# Patient Record
Sex: FEMALE | Race: WHITE | Employment: FULL TIME | URBAN - NONMETROPOLITAN AREA
[De-identification: names, ages, dates, MRNs, and addresses within clinical notes are randomized per-mention and may not be internally consistent; named-entity substitution may affect disease eponyms.]

---

## 2020-07-28 ENCOUNTER — OFFICE VISIT (OUTPATIENT)
Dept: ORTHOPEDIC SURGERY | Age: 50
End: 2020-07-28
Payer: COMMERCIAL

## 2020-07-28 VITALS — BODY MASS INDEX: 37.74 KG/M2 | HEIGHT: 63 IN | WEIGHT: 213 LBS

## 2020-07-28 PROBLEM — S83.241A ACUTE MEDIAL MENISCUS TEAR OF RIGHT KNEE: Status: ACTIVE | Noted: 2020-07-28

## 2020-07-28 PROBLEM — M17.11 PRIMARY OSTEOARTHRITIS OF RIGHT KNEE: Status: ACTIVE | Noted: 2020-07-28

## 2020-07-28 PROCEDURE — G8417 CALC BMI ABV UP PARAM F/U: HCPCS | Performed by: ORTHOPAEDIC SURGERY

## 2020-07-28 PROCEDURE — 99203 OFFICE O/P NEW LOW 30 MIN: CPT | Performed by: ORTHOPAEDIC SURGERY

## 2020-07-28 PROCEDURE — G8428 CUR MEDS NOT DOCUMENT: HCPCS | Performed by: ORTHOPAEDIC SURGERY

## 2020-07-28 NOTE — PROGRESS NOTES
I am evaluating this patient as a consult at the request of Nikkie Rosa DC      Chief Complaint:  New Patient (R KNEE PAIN )      History of Present Illness:  Ellen Smiley is a 53 y.o. female here regarding right knee injury. She has had discomfort on the medial and anterior aspect of the right knee since May, has sharp pain with twisting dull achy pain after standing for long periods of time. She was evaluated by Dr. Kristal Muniz who is treating her for low back pain, as the back pain resolved she began to notice increasing right knee pain  MRI was ordered and she is here today to discuss those results. Works at [employer], job is not physically demanding but repetitive. Currently has 6 out of 10 dull achy pain, not improved with exercise program by a chiropractor and anti-inflammatories. Pain Assessment:       Medical History:  No past medical history on file. No past surgical history on file.   Social History     Socioeconomic History    Marital status:      Spouse name: None    Number of children: None    Years of education: None    Highest education level: None   Occupational History    None   Social Needs    Financial resource strain: None    Food insecurity     Worry: None     Inability: None    Transportation needs     Medical: None     Non-medical: None   Tobacco Use    Smoking status: Never Smoker    Smokeless tobacco: Never Used   Substance and Sexual Activity    Alcohol use: None    Drug use: None    Sexual activity: None   Lifestyle    Physical activity     Days per week: None     Minutes per session: None    Stress: None   Relationships    Social connections     Talks on phone: None     Gets together: None     Attends Voodoo service: None     Active member of club or organization: None     Attends meetings of clubs or organizations: None     Relationship status: None    Intimate partner violence     Fear of current or ex partner: None     Emotionally abused: None Physically abused: None     Forced sexual activity: None   Other Topics Concern    None   Social History Narrative    None     Allergies not on file    Review of Systems:  Constitutional: negative  Respiratory: negative  Cardiovascular: negative  Musculoskeletal:negative except for New Patient (R KNEE PAIN )    Relevant review of systems reviewed and available in the patient's chart in media tab    Vital Signs:  Vitals:    07/28/20 1437   Weight: 213 lb (96.6 kg)   Height: 5' 3\" (1.6 m)         General Exam:   Constitutional: Patient is adequately groomed with no evidence of malnutrition  Mental Status: The patient is oriented to time, place and person. The patient's mood and affect are appropriate. Vascular: Examination reveals no swelling or calf tenderness. Peripheral pulses are palpable and 2+. right Knee Examination  Inspection:   No gross deformities noted. mild swelling noted. No erythema or ecchymosis. Skin is intact. Palpation: positive Tenderness to palpation along the medial joint line, negative Tenderness to palpation along lateral joint line, positive Tenderness to palpation along medial and lateral facets of undersurface of the patella    Range of Motion:  0-130    Strength:  Able to do SLR    Special Tests:  ACL, MCL, PCL, LCL are intact with stress exam.  There positive crepitus noted with range of motion under the patella. A positive grind test.  positive Lakesha's and Apley compression test.       Skin: There are no rashes, ulcerations or lesions. Gait: Normal gait, no assistive devices    Reflex: not tested    Additional Examinations:  Left Lower Extremity: Examination of the left lower extremity does not show any tenderness, deformity or injury. Range of motion is unremarkable. There is no gross instability. There are no rashes, ulcerations or lesions. Strength and tone are normal.      LUMBAR SPINE: The skin is warm and dry. There is no swelling, warmth, or erythema.  Range of motion is within normal limits. There is no paraspinal or spinous process tenderness. Ipsilateral and contralateral straight leg raising tests are negative. The distal neurovascular exam is grossly intact and symmetric. X-RAYS: 4 views weightbearing AP, lateral. PA and sunrise of the right knee were obtained and reviewed, they show no periosteal reaction, medullary lesions, or osteopenia. Mild arthritic changes in the patellofemoral compartment with marginal osteophytes, medial and lateral joint spaces are well maintained. No evidence of fracture or dislocation. MRI images of the right knee are personally reviewed and show:  Medial meniscus radial tear measuring 6 mm, trizonal in the posterior horn and root. Degenerative chondromalacia in the patellofemoral compartment    Assessment : Right knee medial meniscus tear, chondromalacia in the patellofemoral compartment    Impression:  Encounter Diagnoses   Name Primary?  Right knee pain, unspecified chronicity Yes    Acute medial meniscus tear of right knee, initial encounter     Primary osteoarthritis of right knee        Office Procedures:  Orders Placed This Encounter   Procedures    XR KNEE RIGHT (MIN 4 VIEWS)     Order Specific Question:   Reason for exam:     Answer:   RIGHT KNEE PAIN     No orders of the defined types were placed in this encounter. Treatment Plan: I had a lengthy discussion with the patient about the pathophysiology of degenerative medial meniscus tear and chondromalacia and their treatment options. The 1st option would be to pursue no further treatment and continue living with their knee pain and dysfunction. The 2nd treatment option would be to pursue conservative treatment, including physical therapy, injections and oral medications.   The 3rd option would be to pursue surgical intervention including right knee video arthroscopy partial medial meniscectomy and chondroplasty  Risks and benefits of each option were discussed in great detail with the patient, at this time the patient would like to pursue surgical intervention. I spent 25+ minutes face to face with the patient including 13+ minutes discussing and answering questions regarding the risks, benefits, and complications of right knee video arthroscopy partial medial meniscectomy and chondroplasty in detail. We talked about the risks of surgery which include but are not limited to infection, bleeding, nerve injury resulting in motor or sensory loss, DVT, RSD, persistent pain, loss of motion, functional instability, and need for further surgery. At no time were any guarantees implied or stated. The patient acknowledged these risks and electively reviewed and signed the consent form. Surgery will be scheduled for a mutually convenient time. Patient will obtain medical clearance from their PCP prior to surgery.        Derek Sanchez

## 2020-08-14 ENCOUNTER — TELEPHONE (OUTPATIENT)
Dept: ORTHOPEDIC SURGERY | Age: 50
End: 2020-08-14

## 2020-08-14 NOTE — TELEPHONE ENCOUNTER
FAXED COMPLETED FMLA FOR [employer] AND APS FOR PRINCIPAL INS TO [employer] @ [phone]

## 2020-08-17 ENCOUNTER — HOSPITAL ENCOUNTER (OUTPATIENT)
Age: 50
Discharge: HOME | End: 2020-08-17
Payer: COMMERCIAL

## 2020-08-17 ENCOUNTER — TELEPHONE (OUTPATIENT)
Dept: ORTHOPEDIC SURGERY | Age: 50
End: 2020-08-17

## 2020-08-17 PROCEDURE — U0003 INFECTIOUS AGENT DETECTION BY NUCLEIC ACID (DNA OR RNA); SEVERE ACUTE RESPIRATORY SYNDROME CORONAVIRUS 2 (SARS-COV-2) (CORONAVIRUS DISEASE [COVID-19]), AMPLIFIED PROBE TECHNIQUE, MAKING USE OF HIGH THROUGHPUT TECHNOLOGIES AS DESCRIBED BY CMS-2020-01-R: HCPCS

## 2020-08-17 RX ORDER — MULTIVIT-MIN/IRON/FOLIC ACID/K 18-600-40
CAPSULE ORAL
COMMUNITY

## 2020-08-17 SDOH — HEALTH STABILITY: MENTAL HEALTH: HOW OFTEN DO YOU HAVE A DRINK CONTAINING ALCOHOL?: NEVER

## 2020-08-19 ENCOUNTER — ANESTHESIA EVENT (OUTPATIENT)
Dept: OPERATING ROOM | Age: 50
End: 2020-08-19
Payer: COMMERCIAL

## 2020-08-19 LAB — SARS-COV-2, NAA: NOT DETECTED

## 2020-08-24 ENCOUNTER — ANESTHESIA (OUTPATIENT)
Dept: OPERATING ROOM | Age: 50
End: 2020-08-24
Payer: COMMERCIAL

## 2020-08-24 ENCOUNTER — HOSPITAL ENCOUNTER (OUTPATIENT)
Age: 50
Setting detail: OUTPATIENT SURGERY
Discharge: HOME | End: 2020-08-24
Attending: ORTHOPAEDIC SURGERY | Admitting: ORTHOPAEDIC SURGERY
Payer: COMMERCIAL

## 2020-08-24 VITALS
SYSTOLIC BLOOD PRESSURE: 117 MMHG | DIASTOLIC BLOOD PRESSURE: 68 MMHG | OXYGEN SATURATION: 99 % | TEMPERATURE: 98.6 F | RESPIRATION RATE: 19 BRPM

## 2020-08-24 VITALS
TEMPERATURE: 98 F | BODY MASS INDEX: 39.87 KG/M2 | OXYGEN SATURATION: 97 % | HEART RATE: 78 BPM | RESPIRATION RATE: 16 BRPM | DIASTOLIC BLOOD PRESSURE: 84 MMHG | HEIGHT: 63 IN | WEIGHT: 225 LBS | SYSTOLIC BLOOD PRESSURE: 124 MMHG

## 2020-08-24 LAB — PREGNANCY, URINE: NEGATIVE

## 2020-08-24 PROCEDURE — 3600000014 HC SURGERY LEVEL 4 ADDTL 15MIN: Performed by: ORTHOPAEDIC SURGERY

## 2020-08-24 PROCEDURE — 6360000002 HC RX W HCPCS: Performed by: ORTHOPAEDIC SURGERY

## 2020-08-24 PROCEDURE — 2580000003 HC RX 258: Performed by: ANESTHESIOLOGY

## 2020-08-24 PROCEDURE — 2500000003 HC RX 250 WO HCPCS: Performed by: NURSE ANESTHETIST, CERTIFIED REGISTERED

## 2020-08-24 PROCEDURE — 2720000010 HC SURG SUPPLY STERILE: Performed by: ORTHOPAEDIC SURGERY

## 2020-08-24 PROCEDURE — 6370000000 HC RX 637 (ALT 250 FOR IP): Performed by: ORTHOPAEDIC SURGERY

## 2020-08-24 PROCEDURE — 3600000004 HC SURGERY LEVEL 4 BASE: Performed by: ORTHOPAEDIC SURGERY

## 2020-08-24 PROCEDURE — 7100000011 HC PHASE II RECOVERY - ADDTL 15 MIN: Performed by: ORTHOPAEDIC SURGERY

## 2020-08-24 PROCEDURE — 6360000002 HC RX W HCPCS: Performed by: NURSE ANESTHETIST, CERTIFIED REGISTERED

## 2020-08-24 PROCEDURE — 3700000000 HC ANESTHESIA ATTENDED CARE: Performed by: ORTHOPAEDIC SURGERY

## 2020-08-24 PROCEDURE — 2500000003 HC RX 250 WO HCPCS: Performed by: ORTHOPAEDIC SURGERY

## 2020-08-24 PROCEDURE — 2709999900 HC NON-CHARGEABLE SUPPLY: Performed by: ORTHOPAEDIC SURGERY

## 2020-08-24 PROCEDURE — 84703 CHORIONIC GONADOTROPIN ASSAY: CPT

## 2020-08-24 PROCEDURE — 7100000001 HC PACU RECOVERY - ADDTL 15 MIN: Performed by: ORTHOPAEDIC SURGERY

## 2020-08-24 PROCEDURE — 3700000001 HC ADD 15 MINUTES (ANESTHESIA): Performed by: ORTHOPAEDIC SURGERY

## 2020-08-24 PROCEDURE — 7100000010 HC PHASE II RECOVERY - FIRST 15 MIN: Performed by: ORTHOPAEDIC SURGERY

## 2020-08-24 PROCEDURE — 7100000000 HC PACU RECOVERY - FIRST 15 MIN: Performed by: ORTHOPAEDIC SURGERY

## 2020-08-24 PROCEDURE — 6370000000 HC RX 637 (ALT 250 FOR IP)

## 2020-08-24 RX ORDER — LIDOCAINE HYDROCHLORIDE 20 MG/ML
INJECTION, SOLUTION EPIDURAL; INFILTRATION; INTRACAUDAL; PERINEURAL PRN
Status: DISCONTINUED | OUTPATIENT
Start: 2020-08-24 | End: 2020-08-24 | Stop reason: SDUPTHER

## 2020-08-24 RX ORDER — ONDANSETRON 2 MG/ML
4 INJECTION INTRAMUSCULAR; INTRAVENOUS EVERY 30 MIN PRN
Status: DISCONTINUED | OUTPATIENT
Start: 2020-08-24 | End: 2020-08-24 | Stop reason: HOSPADM

## 2020-08-24 RX ORDER — ACETAMINOPHEN 500 MG
1000 TABLET ORAL ONCE
Status: COMPLETED | OUTPATIENT
Start: 2020-08-24 | End: 2020-08-24

## 2020-08-24 RX ORDER — ONDANSETRON 4 MG/1
TABLET, ORALLY DISINTEGRATING ORAL
Status: COMPLETED
Start: 2020-08-24 | End: 2020-08-24

## 2020-08-24 RX ORDER — PROPOFOL 10 MG/ML
INJECTION, EMULSION INTRAVENOUS PRN
Status: DISCONTINUED | OUTPATIENT
Start: 2020-08-24 | End: 2020-08-24 | Stop reason: SDUPTHER

## 2020-08-24 RX ORDER — DIPHENHYDRAMINE HYDROCHLORIDE 50 MG/ML
6.25 INJECTION INTRAMUSCULAR; INTRAVENOUS
Status: DISCONTINUED | OUTPATIENT
Start: 2020-08-24 | End: 2020-08-24 | Stop reason: HOSPADM

## 2020-08-24 RX ORDER — ROCURONIUM BROMIDE 10 MG/ML
INJECTION, SOLUTION INTRAVENOUS PRN
Status: DISCONTINUED | OUTPATIENT
Start: 2020-08-24 | End: 2020-08-24 | Stop reason: SDUPTHER

## 2020-08-24 RX ORDER — BUPIVACAINE HYDROCHLORIDE 2.5 MG/ML
INJECTION, SOLUTION INFILTRATION; PERINEURAL PRN
Status: DISCONTINUED | OUTPATIENT
Start: 2020-08-24 | End: 2020-08-24 | Stop reason: ALTCHOICE

## 2020-08-24 RX ORDER — LIDOCAINE HYDROCHLORIDE 10 MG/ML
INJECTION, SOLUTION EPIDURAL; INFILTRATION; INTRACAUDAL; PERINEURAL
Status: COMPLETED
Start: 2020-08-24 | End: 2020-08-24

## 2020-08-24 RX ORDER — HYDROMORPHONE HCL 110MG/55ML
PATIENT CONTROLLED ANALGESIA SYRINGE INTRAVENOUS PRN
Status: DISCONTINUED | OUTPATIENT
Start: 2020-08-24 | End: 2020-08-24 | Stop reason: SDUPTHER

## 2020-08-24 RX ORDER — DEXAMETHASONE SODIUM PHOSPHATE 4 MG/ML
INJECTION, SOLUTION INTRA-ARTICULAR; INTRALESIONAL; INTRAMUSCULAR; INTRAVENOUS; SOFT TISSUE PRN
Status: DISCONTINUED | OUTPATIENT
Start: 2020-08-24 | End: 2020-08-24 | Stop reason: SDUPTHER

## 2020-08-24 RX ORDER — FENTANYL CITRATE 50 UG/ML
INJECTION, SOLUTION INTRAMUSCULAR; INTRAVENOUS PRN
Status: DISCONTINUED | OUTPATIENT
Start: 2020-08-24 | End: 2020-08-24 | Stop reason: SDUPTHER

## 2020-08-24 RX ORDER — OXYCODONE HYDROCHLORIDE AND ACETAMINOPHEN 5; 325 MG/1; MG/1
1 TABLET ORAL PRN
Status: DISCONTINUED | OUTPATIENT
Start: 2020-08-24 | End: 2020-08-24 | Stop reason: HOSPADM

## 2020-08-24 RX ORDER — SODIUM CHLORIDE 0.9 % (FLUSH) 0.9 %
10 SYRINGE (ML) INJECTION PRN
Status: DISCONTINUED | OUTPATIENT
Start: 2020-08-24 | End: 2020-08-24 | Stop reason: HOSPADM

## 2020-08-24 RX ORDER — ONDANSETRON 2 MG/ML
INJECTION INTRAMUSCULAR; INTRAVENOUS PRN
Status: DISCONTINUED | OUTPATIENT
Start: 2020-08-24 | End: 2020-08-24 | Stop reason: SDUPTHER

## 2020-08-24 RX ORDER — IBUPROFEN 600 MG/1
600 TABLET ORAL EVERY 8 HOURS PRN
Qty: 30 TABLET | Refills: 0 | Status: SHIPPED | OUTPATIENT
Start: 2020-08-24 | End: 2020-09-03

## 2020-08-24 RX ORDER — HYDRALAZINE HYDROCHLORIDE 20 MG/ML
5 INJECTION INTRAMUSCULAR; INTRAVENOUS EVERY 30 MIN PRN
Status: DISCONTINUED | OUTPATIENT
Start: 2020-08-24 | End: 2020-08-24 | Stop reason: HOSPADM

## 2020-08-24 RX ORDER — SODIUM CHLORIDE 0.9 % (FLUSH) 0.9 %
10 SYRINGE (ML) INJECTION EVERY 12 HOURS SCHEDULED
Status: DISCONTINUED | OUTPATIENT
Start: 2020-08-24 | End: 2020-08-24 | Stop reason: HOSPADM

## 2020-08-24 RX ORDER — HYDROCODONE BITARTRATE AND ACETAMINOPHEN 5; 325 MG/1; MG/1
1 TABLET ORAL EVERY 6 HOURS PRN
Qty: 28 TABLET | Refills: 0 | Status: SHIPPED | OUTPATIENT
Start: 2020-08-24 | End: 2020-08-31

## 2020-08-24 RX ORDER — OXYCODONE HYDROCHLORIDE AND ACETAMINOPHEN 5; 325 MG/1; MG/1
2 TABLET ORAL PRN
Status: DISCONTINUED | OUTPATIENT
Start: 2020-08-24 | End: 2020-08-24 | Stop reason: HOSPADM

## 2020-08-24 RX ORDER — LABETALOL HYDROCHLORIDE 5 MG/ML
5 INJECTION, SOLUTION INTRAVENOUS
Status: DISCONTINUED | OUTPATIENT
Start: 2020-08-24 | End: 2020-08-24 | Stop reason: HOSPADM

## 2020-08-24 RX ORDER — KETOROLAC TROMETHAMINE 30 MG/ML
INJECTION, SOLUTION INTRAMUSCULAR; INTRAVENOUS PRN
Status: DISCONTINUED | OUTPATIENT
Start: 2020-08-24 | End: 2020-08-24 | Stop reason: SDUPTHER

## 2020-08-24 RX ORDER — ONDANSETRON 4 MG/1
4 TABLET, ORALLY DISINTEGRATING ORAL ONCE
Status: DISCONTINUED | OUTPATIENT
Start: 2020-08-24 | End: 2020-08-24 | Stop reason: HOSPADM

## 2020-08-24 RX ORDER — MIDAZOLAM HYDROCHLORIDE 1 MG/ML
INJECTION INTRAMUSCULAR; INTRAVENOUS PRN
Status: DISCONTINUED | OUTPATIENT
Start: 2020-08-24 | End: 2020-08-24 | Stop reason: SDUPTHER

## 2020-08-24 RX ORDER — SODIUM CHLORIDE, SODIUM LACTATE, POTASSIUM CHLORIDE, CALCIUM CHLORIDE 600; 310; 30; 20 MG/100ML; MG/100ML; MG/100ML; MG/100ML
INJECTION, SOLUTION INTRAVENOUS CONTINUOUS
Status: DISCONTINUED | OUTPATIENT
Start: 2020-08-24 | End: 2020-08-24 | Stop reason: HOSPADM

## 2020-08-24 RX ADMIN — LIDOCAINE HYDROCHLORIDE 0.1 MG: 10 INJECTION, SOLUTION EPIDURAL; INFILTRATION; INTRACAUDAL; PERINEURAL at 06:37

## 2020-08-24 RX ADMIN — KETOROLAC TROMETHAMINE 30 MG: 30 INJECTION, SOLUTION INTRAMUSCULAR at 08:10

## 2020-08-24 RX ADMIN — Medication 2 G: at 07:22

## 2020-08-24 RX ADMIN — SODIUM CHLORIDE, POTASSIUM CHLORIDE, SODIUM LACTATE AND CALCIUM CHLORIDE: 600; 310; 30; 20 INJECTION, SOLUTION INTRAVENOUS at 07:58

## 2020-08-24 RX ADMIN — FENTANYL CITRATE 50 MCG: 50 INJECTION INTRAMUSCULAR; INTRAVENOUS at 07:26

## 2020-08-24 RX ADMIN — ONDANSETRON 4 MG: 2 INJECTION, SOLUTION INTRAMUSCULAR; INTRAVENOUS at 07:35

## 2020-08-24 RX ADMIN — DEXAMETHASONE SODIUM PHOSPHATE 10 MG: 4 INJECTION, SOLUTION INTRAMUSCULAR; INTRAVENOUS at 07:35

## 2020-08-24 RX ADMIN — ONDANSETRON 4 MG: 4 TABLET, ORALLY DISINTEGRATING ORAL at 09:08

## 2020-08-24 RX ADMIN — ACETAMINOPHEN 1000 MG: 500 TABLET, FILM COATED ORAL at 06:36

## 2020-08-24 RX ADMIN — FENTANYL CITRATE 100 MCG: 50 INJECTION INTRAMUSCULAR; INTRAVENOUS at 07:24

## 2020-08-24 RX ADMIN — PROPOFOL 200 MG: 10 INJECTION, EMULSION INTRAVENOUS at 07:26

## 2020-08-24 RX ADMIN — ROCURONIUM BROMIDE 15 MG: 10 INJECTION, SOLUTION INTRAVENOUS at 07:26

## 2020-08-24 RX ADMIN — MIDAZOLAM 2 MG: 1 INJECTION INTRAMUSCULAR; INTRAVENOUS at 07:22

## 2020-08-24 RX ADMIN — FENTANYL CITRATE 50 MCG: 50 INJECTION INTRAMUSCULAR; INTRAVENOUS at 07:33

## 2020-08-24 RX ADMIN — HYDROMORPHONE HYDROCHLORIDE 1 MG: 2 INJECTION, SOLUTION INTRAMUSCULAR; INTRAVENOUS; SUBCUTANEOUS at 08:04

## 2020-08-24 RX ADMIN — SODIUM CHLORIDE, POTASSIUM CHLORIDE, SODIUM LACTATE AND CALCIUM CHLORIDE: 600; 310; 30; 20 INJECTION, SOLUTION INTRAVENOUS at 06:36

## 2020-08-24 RX ADMIN — LIDOCAINE HYDROCHLORIDE 3 ML: 20 INJECTION, SOLUTION EPIDURAL; INFILTRATION; INTRACAUDAL; PERINEURAL at 07:26

## 2020-08-24 ASSESSMENT — PULMONARY FUNCTION TESTS
PIF_VALUE: 1
PIF_VALUE: 3
PIF_VALUE: 4
PIF_VALUE: 4
PIF_VALUE: 1
PIF_VALUE: 7
PIF_VALUE: 4
PIF_VALUE: 6
PIF_VALUE: 23
PIF_VALUE: 1
PIF_VALUE: 4
PIF_VALUE: 22
PIF_VALUE: 24
PIF_VALUE: 1
PIF_VALUE: 2
PIF_VALUE: 4
PIF_VALUE: 24
PIF_VALUE: 1
PIF_VALUE: 23
PIF_VALUE: 21
PIF_VALUE: 23
PIF_VALUE: 23
PIF_VALUE: 6
PIF_VALUE: 2
PIF_VALUE: 4
PIF_VALUE: 1
PIF_VALUE: 27
PIF_VALUE: 5
PIF_VALUE: 24
PIF_VALUE: 24
PIF_VALUE: 2
PIF_VALUE: 13
PIF_VALUE: 5
PIF_VALUE: 4
PIF_VALUE: 3
PIF_VALUE: 19
PIF_VALUE: 5
PIF_VALUE: 23
PIF_VALUE: 4
PIF_VALUE: 5
PIF_VALUE: 5
PIF_VALUE: 3
PIF_VALUE: 4
PIF_VALUE: 6
PIF_VALUE: 24
PIF_VALUE: 6
PIF_VALUE: 5
PIF_VALUE: 8
PIF_VALUE: 15
PIF_VALUE: 5
PIF_VALUE: 3
PIF_VALUE: 3
PIF_VALUE: 5
PIF_VALUE: 6
PIF_VALUE: 3
PIF_VALUE: 23
PIF_VALUE: 24

## 2020-08-24 ASSESSMENT — PAIN - FUNCTIONAL ASSESSMENT
PAIN_FUNCTIONAL_ASSESSMENT: 0-10
PAIN_FUNCTIONAL_ASSESSMENT: ACTIVITIES ARE NOT PREVENTED

## 2020-08-24 ASSESSMENT — PAIN DESCRIPTION - DESCRIPTORS: DESCRIPTORS: ACHING;BURNING;SHARP;SHOOTING

## 2020-08-24 ASSESSMENT — PAIN SCALES - GENERAL: PAINLEVEL_OUTOF10: 0

## 2020-08-24 NOTE — PROGRESS NOTES
Pt ready for d/c home, instructions given and reviewed with pt and family, they denied questions, pt taken by wheelchair to private auto for d/c home in stable condition.

## 2020-08-24 NOTE — ANESTHESIA POSTPROCEDURE EVALUATION
Department of Anesthesiology  Postprocedure Note    Patient: Joe Obrien  MRN: [id number]  YOB: 1970  Date of evaluation: 8/24/2020  Time:  2:12 PM     Procedure Summary     Date:  08/24/20 Room / Location:  [facility] EG OR 03 / [facility]    Anesthesia Start:  Monae Rising Anesthesia Stop:  1976    Procedure:  RIGHT KNEE VIDEO ARTHROSCOPY, PARTIAL MEDIAL MENISCECTOMY, CHONDROPLASTY (Right Knee) Diagnosis:  (ACUTE MEDIAL MENISCUS TEAR RIGHT KNEE)    Surgeon:  Mar Wheat DO Responsible Provider:  Oscar Gonzalez MD    Anesthesia Type:  general ASA Status:  3          Anesthesia Type: general    Yonny Phase I: Yonny Score: 10    Yonny Phase II: Yonny Score: 10    Last vitals: Reviewed and per EMR flowsheets. Anesthesia Post Evaluation    Comments: Postoperative Anesthesia Note    Name:    Joe Obrien  MRN:      [id number]    Patient Vitals in the past 12 hrs:  08/24/20 0851, BP:124/84, Pulse:78, Resp:16, SpO2:97 %  08/24/20 0840, BP:127/86, Temp:98 °F (36.7 °C), Temp src:Temporal, Pulse:82, Resp:16, SpO2:97 %  08/24/20 0835, BP:128/82, Pulse:82, Resp:16, SpO2:95 %  08/24/20 0830, BP:127/65, Pulse:84, Resp:16, SpO2:92 %  08/24/20 0825, BP:134/85, Temp:98 °F (36.7 °C), Temp src:Temporal, Pulse:88, Resp:18, SpO2:93 %  08/24/20 0616, BP:(!) 137/90, Temp:98.3 °F (36.8 °C), Temp src:Temporal, Pulse:97, Resp:20, SpO2:100 %, Height:5' 3\" (1.6 m), Weight:225 lb (102.1 kg)     LABS:    CBC  No results found for: WBC, HGB, HCT, PLT  RENAL  No results found for: NA, K, CL, CO2, BUN, CREATININE, GLUCOSE  COAGS  No results found for: PROTIME, INR, APTT    Intake & Output: In: 1300 (I.V.:1300)  Out: 15     Nausea & Vomiting:  No    Level of Consciousness:  Awake    Pain Assessment:  Adequate analgesia    Anesthesia Complications:  No apparent anesthetic complications    SUMMARY      Vital signs stable  OK to discharge from Stage I post anesthesia care.   Care transferred from Anesthesiology department on discharge from perioperative area

## 2020-08-24 NOTE — H&P
I have reviewed the history and physical and examined the patient and find no relevant changes. I have reviewed with the patient and/or family the risks, benefits, and alternatives to the procedure. History reviewed. No pertinent past medical history. Past Surgical History:   Procedure Laterality Date     SECTION      CHOLECYSTECTOMY      EYE SURGERY         Scheduled Meds:   ceFAZolin  2 g Intravenous Once    sodium chloride flush  10 mL Intravenous 2 times per day    famotidine (PEPCID) injection  20 mg Intravenous Once     Continuous Infusions:   lactated ringers 125 mL/hr at 20 0636     PRN Meds:.sodium chloride flush, bupivacaine, HYDROmorphone, HYDROmorphone, oxyCODONE-acetaminophen **OR** oxyCODONE-acetaminophen, diphenhydrAMINE, ondansetron, labetalol, hydrALAZINE, meperidine    Allergies   Allergen Reactions    Pcn [Penicillins] Rash    Percocet [Oxycodone-Acetaminophen] Rash       Vitals:    20 0616   BP: (!) 137/90   Pulse: 97   Resp: 20   Temp: 98.3 °F (36.8 °C)   SpO2: 100%         The patient was counseled at length about the risks of alix Covid-19 during their perioperative period and any recovery window from their procedure. The patient was made aware that alix Covid-19  may worsen their prognosis for recovering from their procedure  and lend to a higher morbidity and/or mortality risk. All material risks, benefits, and reasonable alternatives including postponing the procedure were discussed. The patient does wish to proceed with the procedure at this time.             Annetta Johnson DO  2020

## 2020-08-24 NOTE — OP NOTE
Bucky Cost (1970)    8/24/2020 Date of surgery    1.  right knee medial meniscus tear      2. Outerbridge Grade 3 changes patella, trochlea, medial femoral condyle, medial tibia plateau, lateral femoral condyle, lateral tibia plateau right knee          Postoperative Diagnosis- 1.  left knee medial meniscus tear      2. Outerbridge Grade 3 changes patella, trochlea, medial femoral condyle, medial tibia plateau, lateral femoral condyle, lateral tibia plateau                     Procedure-  1. Partial medial meniscectomy right knee (CPT 87124 medial OR lateral)    2. Chondroplasty 3 patella, trochlea, medial femoral condyle, medial tibia plateau, lateral tibia plateau  right knee (CPT 21567)    3. Diagnostic arthroscopy  right knee (CPT 55008)      Surgeon-  Pieter Pierson DO    Assistant(s)-  Scrub Person First: Roula Nava     Anesthesia- General    Tourniquet Time: 31 min    EBL: minimal    Indications for Operation  The patient was evaluated in the office with history and symptoms consistent with the above diagnosis. Appropriate diagnostic testing was performed confirming the recommendation to proceed with surgical intervention. Options of treatment, both non-operative and operative were discussed. Today the surgical procedure was again discussed in detail. The risks and possible complications of the surgery in general, as well as those directly related to this procedure were reviewed today. No guarantees were implied or stated. General risks include but are not limited to persistent pain, infection, excessive blood loss, neurovascular injury, chronic swelling or edema, and the potential need for additional treatment or surgical intervention in the future. The patient understands that, again, the risks and possible complications are not limited to those specifically stated above.  In addition today, we discussed the preoperative and postoperative protocol, the often lengthy recovery, and the expectation that the patient will be compliant with instructions and perform the appropriate rehab program as prescribed. The patient accepted the above risks, possible complications, and protocol and elects to proceed. All questions were answered appropriately, and they understand that they can contact the office at any time to further discuss any questions or concerns. Site Marking and Surgical Prep  The patient was seen in the holding area and the  right knee was marked with a pen. The patient was taken to the operative suite, identified, placed on the operating room table in the supine position. After induction of general anesthesia, the extremity was elevated, prepped with Duraprep and draped in the normal, sterile fashion. Tourniquet was inflated to 300 mmhg for the duration of the case. Examination  Under Anesthesia  There was full range of motion, no cruciate or collateral ligament insufficiency. Diagnostic Arthroscopy  A standard inferolateral portal was established. The trocar and cannula were inserted. The trocar was removed and the arthroscope and inflow inserted. The inferomedial portal was established under direct vision after localizing the joint with a spinal needle. A nerve hook was inserted and all relevant structures probed. Systematic arthroscopy was performed and the findings are summarized below:  1. Patellofemoral Compartment- The articular cartilage showed Outerbridge Grade 3 changes patella and trochlea. There were no loose bodies in the suprapatellar pouch  2. Medial Compartment- The medial meniscus radial tear posterior horn , Outerbridge Grade 3 changes medial femoral condyle and medial tibia plateau  3. Intercondylar Notch- The ACL and PCL were intact  4. Lateral Compartment- The lateral meniscus was intact , Outerbridge Grade 2-3 changes lateral femoral condyle and lateral tibia plateau     Mensical/Chondral Work  5. Meniscectomy  a.  Partial medial meniscectomy. Using a combination of biters and a shaver, the tear was carefully debrided to a stable rim. 6. Chondroplasty  a. Using a shaver, the frayed chondral edges of the patella, trochlea, medial femoral condyle, medial tibial plateau, lateral tibial plateau  were carefully debrided to a smooth surface with stable borders. Closure  The portal sites were closed with 4-0 Nylon. Marcaine (0.5%) was injected into the joint. Sterile dressings and an elastic bandage were placed. The patient was awakened and taken to the postoperative area in stable condition. The toes were pink and warm. All sponge and needle counts were correct. Postop Instructions      The patient was instructed to minimize activity and ice their surgical extremity frequently for the first 24-72 hours. They should use 1 or 2 crutches as needed to bear as much weight as possible on the operated leg. Prior to discharge crutch training, a prescription for a narcotic and follow up appointment was provided.      Usha Hill

## 2020-08-24 NOTE — ANESTHESIA PRE PROCEDURE
Department of Anesthesiology  Preprocedure Note       Name:  Meliton Fuentes   Age:  53 y.o.  :  1970                                          MRN:  [id number]         Date:  2020      Surgeon: Hans Heller):  Art Mcbride DO    Procedure: Procedure(s):  RIGHT KNEE VIDEO ARTHROSCOPY, PARTIAL MEDIAL MENISCECTOMY, CHONDROPLASTY    Medications prior to admission:   Prior to Admission medications    Medication Sig Start Date End Date Taking?  Authorizing Provider   Cholecalciferol (VITAMIN D) 50 MCG (2000) CAPS capsule Take by mouth   Yes Historical Provider, MD       Current medications:    Current Facility-Administered Medications   Medication Dose Route Frequency Provider Last Rate Last Dose    ceFAZolin (ANCEF) 2 g in sterile water 20 mL IV syringe  2 g Intravenous Once Art Mcbride DO        lactated ringers infusion   Intravenous Continuous Ray Croft  mL/hr at 20 0636      sodium chloride flush 0.9 % injection 10 mL  10 mL Intravenous 2 times per day Ray Croft MD        sodium chloride flush 0.9 % injection 10 mL  10 mL Intravenous PRN Ray Croft MD        famotidine (PEPCID) injection 20 mg  20 mg Intravenous Once Ray Croft MD        bupivacaine (MARCAINE) 0.25 % injection    PRN Nikki Way DO   30 mL at 20 1441    HYDROmorphone (DILAUDID) injection 0.5 mg  0.5 mg Intravenous Q10 Min PRN Len Gallegos MD        HYDROmorphone (DILAUDID) injection 0.5 mg  0.5 mg Intravenous Q5 Min PRN Len Gallegos MD        oxyCODONE-acetaminophen (PERCOCET) 5-325 MG per tablet 1 tablet  1 tablet Oral PRN Len Gallegos MD        Or    oxyCODONE-acetaminophen (PERCOCET) 5-325 MG per tablet 2 tablet  2 tablet Oral PRN Len Gallegos MD        diphenhydrAMINE (BENADRYL) injection 6.25 mg  6.25 mg Intravenous Once PRN Len Gallegos MD        ondansetron [facility]) injection 4 mg  4 mg AGRATIO, LABGLOM, GLUCOSE, PROT, CALCIUM, BILITOT, ALKPHOS, AST, ALT    POC Tests: No results for input(s): POCGLU, POCNA, POCK, POCCL, POCBUN, POCHEMO, POCHCT in the last 72 hours. Coags: No results found for: PROTIME, INR, APTT    HCG (If Applicable):   Lab Results   Component Value Date    PREGTESTUR Negative 08/24/2020        ABGs: No results found for: PHART, PO2ART, NVR7HBB, ZWE0UCI, BEART, H6MWYWTO     Type & Screen (If Applicable):  No results found for: LABABO, LABRH    Drug/Infectious Status (If Applicable):  No results found for: HIV, HEPCAB    COVID-19 Screening (If Applicable):   Lab Results   Component Value Date    COVID19 Not Detected 08/17/2020         Anesthesia Evaluation  Patient summary reviewed and Nursing notes reviewed no history of anesthetic complications:   Airway: Mallampati: III     Neck ROM: full   Dental:          Pulmonary:                              Cardiovascular:                      Neuro/Psych:               GI/Hepatic/Renal:   (+) morbid obesity          Endo/Other:                     Abdominal:           Vascular:                                        Anesthesia Plan      general     ASA 3     (Medications & allergies reviewed  All available lab & EKG data reviewed)  Induction: intravenous. Anesthetic plan and risks discussed with patient. Plan discussed with CRNA.                   Coty Araujo MD   8/24/2020

## 2020-09-01 ENCOUNTER — HOSPITAL ENCOUNTER (OUTPATIENT)
Dept: PHYSICAL THERAPY | Age: 50
Setting detail: THERAPIES SERIES
Discharge: HOME | End: 2020-09-01
Payer: COMMERCIAL

## 2020-09-01 ENCOUNTER — OFFICE VISIT (OUTPATIENT)
Dept: ORTHOPEDIC SURGERY | Age: 50
End: 2020-09-01

## 2020-09-01 VITALS — BODY MASS INDEX: 39.87 KG/M2 | WEIGHT: 225 LBS | HEIGHT: 63 IN

## 2020-09-01 PROCEDURE — 99024 POSTOP FOLLOW-UP VISIT: CPT | Performed by: ORTHOPAEDIC SURGERY

## 2020-09-01 PROCEDURE — 97161 PT EVAL LOW COMPLEX 20 MIN: CPT

## 2020-09-01 PROCEDURE — 97110 THERAPEUTIC EXERCISES: CPT

## 2020-09-01 PROCEDURE — 97140 MANUAL THERAPY 1/> REGIONS: CPT

## 2020-09-01 PROCEDURE — 97112 NEUROMUSCULAR REEDUCATION: CPT

## 2020-09-01 NOTE — PLAN OF CARE
[place] [id number] [facility] [address], [address], [place], [address] [facility]  Phone: [phone], [phone]                                                       Physical Therapy Certification    Dear Referring Practitioner: Faraz Samaniego,    We had the pleasure of evaluating the following patient for physical therapy services at [address] [place] [address]. A summary of our findings can be found in the initial assessment below. This includes our plan of care. If you have any questions or concerns regarding these findings, please do not hesitate to contact me at the office phone number checked above. Thank you for the referral.       Physician Signature:_______________________________Date:__________________  By signing above (or electronic signature), therapists plan is approved by physician    Patient: Bucky Pop   : 1970   MRN: [id number]  Referring Physician: Referring Practitioner: Faraz Samaniego      Evaluation Date: 2020      Medical Diagnosis Information:  Diagnosis: Acute Medial Meniscus Tear R Knee s/p R Partial Medial Meniscectomy  2020   Treatment Diagnosis: S83.241D                                         Insurance information: PT Insurance Information: [facility]     Precautions/ Contra-indications/Relevant Medical History: Partial Med Meniscectomy with Chondroplasty     C-SSRS Triggered by Intake questionnaire (Past 2 wk assessment):   [x] No, Questionnaire did not trigger screening.   [] Yes, Patient intake triggered further evaluation      [] C-SSRS Screening completed  [] PCP notified via Plan of Care  [] Emergency services notified     Latex Allergy:  [x]NO      []YES  Preferred Language for Healthcare:   [x]English       []other:    SUBJECTIVE: Patient stated complaint Patient is a  54 y/o female who presents s/p Right Knee Partial Medial Meniscectomy with Chondroplasty of Patella, Trochlea, and Medial Femoral Condyle.  Patient reports no known issues or injuries prior to her R knee prior to her surgery. Functional Disability Index:  LEFS: 70% (Score: 24/80)    Pain Scale: 5/10  Easing factors:  Elevation, ice  Provocative factors: R LE dangling     Type: []Constant   [x]Intermittent  []Radiating []Localized []other:     Numbness/Tingling: Patient denies numbness and tingling. Occupation/School:      Living Status/Prior Level of Function: Independent with ADLs and IADLs     OBJECTIVE:     ROM LEFT RIGHT   HIP Flex     HIP Abd     HIP Ext     HIP IR     HIP ER     Knee ext 0 °  0 °    Knee Flex 115 °  90 °    Ankle PF     Ankle DF     Ankle In     Ankle Ev     Strength  LEFT RIGHT   HIP Flexors 4/5 3+/5   HIP Abductors \" \"   HIP Ext \" \"   Hip ER \" \"   Knee EXT (quad) 4/5 3/5   Knee Flex (HS) \" \"   Ankle DF     Ankle PF     Ankle Inv     Ankle EV          Balance (up to 10 sec) LEFT RIGHT   Feet Together  10\"   Off Set Stance 5\" 5\"   Tandem Stance 5\" 2\"   Single Limb 5\" 0\"        Circumference   18\"   17 3/4\"       Reflexes/Sensation:    [x]Dermatomes/Myotomes intact    [x]Reflexes equal and normal bilaterally   []Other:    Joint mobility:    []Normal    [x]Hypo   []Hyper    Palpation: noted tenderness with mild pressure     Functional Mobility/Transfers: decreased transfers and ambulation    Posture: decreased TKE and flexed trunk    Bandages/Dressings/Incisions: surgical incisions    Gait: (include devices/WB status) decreased heel strike and terminal knee extension; WBAT with B crutches consistently      Orthopedic Special Tests: n/a post-op                       [x] Patient history, allergies, meds reviewed. Medical chart reviewed. See intake form. Review Of Systems (ROS):  [x]Performed Review of systems (Integumentary, CardioPulmonary, Neurological) by intake and observation. Intake form has been scanned into medical record.  Patient has been instructed to contact their primary care physician regarding ROS personal factors and/or comorbidities that impact the plan of care  [x] An examination of body systems using standardized tests and measures addressing any of the following: body structures and functions (impairments), activity limitations, and/or participation restrictions;:  [] a total of 1-2 or more elements   [] a total of 3 or more elements   [x] a total of 4 or more elements   [x] A clinical presentation with:  [x] stable and/or uncomplicated characteristics   [] evolving clinical presentation with changing characteristics  [] unstable and unpredictable characteristics;   [x] Clinical decision making of [x] low, [] moderate, [] high complexity using standardized patient assessment instrument and/or measurable assessment of functional outcome. [x] EVAL (LOW) 75125 (typically 20 minutes face-to-face)  [] EVAL (MOD) 67620 (typically 30 minutes face-to-face)  [] EVAL (HIGH) 81294 (typically 45 minutes face-to-face)  [] RE-EVAL     PLAN  Frequency/Duration:  1-2 days per week for 12 weeks:  Interventions:  [x]  Therapeutic exercise including: strength training, ROM, for Lower extremity and core   [x]  NMR activation and proprioception for LE, Glutes and Core   [x]  Manual therapy as indicated for LE, Hip and spine to include: Dry Needling/IASTM, STM, PROM, Gr I-IV mobilizations, manipulation. [x] Modalities as needed that may include: thermal agents, E-stim, Biofeedback, US, iontophoresis as indicated  [x] Patient education on joint protection, postural re-education, activity modification, progression of HEP. HEP instruction: (see scanned forms)    GOALS:    Therapist goals for Patient:   Short Term Goals: To be achieved in: 2 weeks  1. Independent in HEP and progression per patient tolerance, in order to prevent re-injury. [] Progressing: [] Met: [] Not Met: [] Adjusted   2.  Patient will have a decrease in pain to facilitate improvement in movement, function, and ADLs as indicated by Functional Deficits. [] Progressing: [] Met: [] Not Met: [] Adjusted     Long Term Goals: To be achieved in: 12 weeks  1. Disability index score of 20% or less for the LEFS to assist with reaching prior level of function. [] Progressing: [] Met: [] Not Met: [] Adjusted   2. Patient will demonstrate increased AROM of R Knee from 0 -120 °  To all for ease with squatting when lifting items at work to allow for proper joint functioning as indicated by patients Functional Deficits. [] Progressing: [] Met: [] Not Met: [] Adjusted   3. Patient will demonstrate an increase in strength of R Knee/Hip to 5/5 to be able to squat, lift, and stand for at least 8 hours at work. [] Progressing: [] Met: [] Not Met: [] Adjusted   4. Patient will return demonstrate improved SLS on LE to >/ 10 seconds to decreased fall risk or risk injury during the work day when having to shift weight to one side when reaching for items. [] Progressing: [] Met: [] Not Met: [] Adjusted  5. Patient will ambulate without AD with proper heel to toe sequencing gait pattern   [] Progressing: [] Met: [] Not Met: [] Adjusted  6. Patient will have 0/10 pain with ADL's.  [] Progressing: [] Met: [] Not Met: [] Adjusted   7. Patient stated goal: To return to PLOF and walk without pain.   [] Progressing: [] Met: [] Not Met: [] Adjusted      Electronically signed by:  Zack Leo PT

## 2020-09-01 NOTE — PROGRESS NOTES
Chief Complaint  Post-Op Check (R KNEE VAS 8/24/20 )    Post op right knee arthroscopy right knee partial medial meniscectomy and chondroplasty  DOS: August 24, 2020       History of Present Illness:  Baljeet Peck is a 53 y.o. female  Here for first follow up of right knee arthroscopy  partial medial meniscectomy and chondroplasty. Progressing well. Has not started outpatient physical therapy. The patient's pain is rated at 5/10. The patient denies fever, wound drainage, increasing redness, pus, increasing swelling. Taking oral pain medication as needed. Using local cold therapy as needed. PHYSICAL EXAMINATION:    Ht 5' 3\" (1.6 m)   Wt 225 lb (102.1 kg)   LMP 08/10/2020   BMI 39.86 kg/m²     Pain Assessment:  Pain Assessment  Location of Pain: Knee  Location Modifiers: Right  Severity of Pain: 5  Quality of Pain: Aching  Duration of Pain: Persistent  Frequency of Pain: Constant  Aggravating Factors: Bending, Stretching, Straightening, Standing, Walking, Stairs  Limiting Behavior: Yes  Relieving Factors: Rest, Ice  Result of Injury: No  Work-Related Injury: No  Are there other pain locations you wish to document?: No    Knee Examination:  Patient is awake, alert, and in no acute distress. Dressing removed today. Portals are healing well. Skin is intact. Minimal ecchymosis. Sensation is intact to light touch throughout lower extremity. The wound is clean, dry, no drainage. There is no warmth, erythema, or purulent drainage over the incision. Patient tolerated gentle passive range of motion. ROM has been progressing. Intra-operative findings were discussed. None  Diagnostic Test Findings: No new xrays taken today      Assessment: Progressing well post op from right knee arthroscopy with  partial medial meniscectomy and chondroplasty. Impression:  Encounter Diagnoses   Name Primary?     Primary osteoarthritis of right knee Yes    Acute medial meniscus tear of right knee,

## 2020-09-01 NOTE — FLOWSHEET NOTE
[facility], [address] [place] [address] [address], [id number]    Physical Therapy Treatment Note/ Progress Report:     Date:  2020    Patient Name:  Shania Grier    :  1970  MRN: [id number]  Restrictions/Precautions:    Medical/Treatment Diagnosis Information:  · Diagnosis: Acute Medial Meniscus Tear R Knee s/p R Partial Medial Meniscectomy  2020  · Treatment Diagnosis: P69.514W  Insurance/Certification information:  PT Insurance Information: [facility]  Physician Information:  Referring Practitioner: Humaira Mcclellan  Has the plan of care been signed (Y/N):        []  Yes  [x]  No     Date of Patient follow up with Physician:     Is this a Progress Report:     []  Yes  [x]  No      If Yes:  Date Range for reporting period:  Initial Eval: 2020  Beginnin2020 --- Ending: 10/1/2020    Progress report will be due (10 Rx or 30 days whichever is less): 375     Recertification will be due (POC Duration  / 90 days whichever is less): 2020      Visit # Insurance Allowable Auth Required   In Person 1 30 visits ( if more then 30 visits needed Med Review required) []  Yes     []  No    Tele Health -  []  Yes     []  No    Total 1       Functional Scale: LEFS: 70% (Score: 24/80)   Date assessed: 2020      Latex Allergy:  [x]NO      []YES  Preferred Language for Healthcare:   [x]English       []other:    Pain level:  5/10     SUBJECTIVE:  See eval    OBJECTIVE: See eval   Observation:    Test measurements:      RESTRICTIONS/PRECAUTIONS: Partial Med Meniscectomy with Chondroplasty        Exercises/Interventions:   Therapeutic Ex (67380)  Therapeutic Activity (26495)  NMR re-education (62046) Sets/Reps Notes/CUES   Bike          Quad Sets 15 x 5\"    Heel Slides 15 x 5\"    SLR 2 x 10    SSLR 2 x 10     HL Hip Add 20 x 5\"     HL Hip Abd 3 Way 15 x 5\" Red TBand   Heel Prop 5 mins         Long Sit Gastroc Stretch 3 x 30\"    Long Sit HS stretch 3 x 30\"         Standing HR/TR Gait Training with B Crutches and 1 Crutch 5'                                  Manual Intervention (36368)     Patella Mobs 5'    FPROM Knee Flex/Ext 5'                                            Patient Education 5'  Pt ed with HEP and progression of PT tx       Therapeutic Exercise and NMR EXR  [x] (16473) Provided verbal/tactile cueing for activities related to strengthening, flexibility, endurance, ROM for improvements in LE, proximal hip, and core control with self care, mobility, lifting, ambulation. [x] (56829) Provided verbal/tactile cueing for activities related to improving balance, coordination, kinesthetic sense, posture, motor skill, proprioception to assist with LE, proximal hip, and core control in self-care, mobility, lifting, ambulation and eccentric single leg control. NMR and Therapeutic Activities:    [x] (33372 or 56664) Provided verbal/tactile cueing for activities related to improving balance, coordination, kinesthetic sense, posture, motor skill, proprioception and motor activation to allow for proper function of core, proximal hip and LE with self-care and ADLs and functional mobility.    [x] (04238) Gait Re-education- Provided training and instruction to the patient for proper LE, core and proximal hip recruitment and positioning and eccentric body weight control with ambulation re-education including up and down stairs     Home Exercise Program:    [x] (44600) Reviewed/Progressed HEP activities related to strengthening, flexibility, endurance, ROM of core, proximal hip and LE for functional self-care, mobility, lifting and ambulation/stair navigation   [x] (78790) Reviewed/Progressed HEP activities related to improving balance, coordination, kinesthetic sense, posture, motor skill, proprioception of core, proximal hip and LE for self-care, mobility, lifting, and ambulation/stair navigation      Manual Treatments:  PROM / STM / Oscillations-Mobs:  G-I, II, III, IV (PA's, Inf., Post.)  [x] (20204) Provided manual therapy to mobilize LE, proximal hip and/or LS spine soft tissue/joints for the purpose of modulating pain, promoting relaxation, increasing ROM, reducing/eliminating soft tissue swelling/inflammation/restriction, improving soft tissue extensibility and allowing for proper ROM for normal function with self-care, mobility, lifting and ambulation. Modalities:      Charges:  Timed Code Treatment Minutes: 40'   Total Treatment Minutes:  60'   [address]:  TE TIME:  NMR TIME:  MANUAL TIME:  UNTIMED MINUTES:   -  -  -  -      [x] EVAL (LOW) 53034 (typically 20 minutes face-to-face)  [] EVAL (MOD) 50978 (typically 30 minutes face-to-face)  [] EVAL (HIGH) 79106 (typically 45 minutes face-to-face)  [] RE-EVAL     [x] DZ(68943) x   1  [] IONTO  [x] NMR (20978) x  1   [] VASO  [x] Manual (40834) x  1   [] Other:  [] TA x      [] Mech Traction (67540)  [] ES(attended) (61458)      [] ES (un) (87576):    ASSESSMENT:  See eval    GOALS:   Short Term Goals: To be achieved in: 2 weeks  1. Independent in HEP and progression per patient tolerance, in order to prevent re-injury. [] Progressing: [] Met: [] Not Met: [] Adjusted   2. Patient will have a decrease in pain to facilitate improvement in movement, function, and ADLs as indicated by Functional Deficits. [] Progressing: [] Met: [] Not Met: [] Adjusted     Long Term Goals: To be achieved in: 12 weeks  1. Disability index score of 20% or less for the LEFS to assist with reaching prior level of function. [] Progressing: [] Met: [] Not Met: [] Adjusted   2. Patient will demonstrate increased AROM of R Knee from 0 -120 °  To all for ease with squatting when lifting items at work to allow for proper joint functioning as indicated by patients Functional Deficits. [] Progressing: [] Met: [] Not Met: [] Adjusted   3.  Patient will demonstrate an increase in strength of R Knee/Hip to 5/5 to be able to squat, lift, and stand for at least 8 hours at pending MD visit [] Discharge    Electronically signed by:  Cece Teran PT    Note: If patient does not return for scheduled/ recommended follow up visits, this note will serve as a discharge from care along with most recent update on progress.

## 2020-09-03 ENCOUNTER — HOSPITAL ENCOUNTER (OUTPATIENT)
Dept: PHYSICAL THERAPY | Age: 50
Setting detail: THERAPIES SERIES
Discharge: HOME | End: 2020-09-03
Payer: COMMERCIAL

## 2020-09-03 PROCEDURE — 97110 THERAPEUTIC EXERCISES: CPT

## 2020-09-03 PROCEDURE — 97140 MANUAL THERAPY 1/> REGIONS: CPT

## 2020-09-03 PROCEDURE — 97112 NEUROMUSCULAR REEDUCATION: CPT

## 2020-09-03 NOTE — FLOWSHEET NOTE
activities related to improving balance, coordination, kinesthetic sense, posture, motor skill, proprioception of core, proximal hip and LE for self-care, mobility, lifting, and ambulation/stair navigation      Manual Treatments:  PROM / STM / Oscillations-Mobs:  G-I, II, III, IV (PA's, Inf., Post.)  [x] (83687) Provided manual therapy to mobilize LE, proximal hip and/or LS spine soft tissue/joints for the purpose of modulating pain, promoting relaxation, increasing ROM, reducing/eliminating soft tissue swelling/inflammation/restriction, improving soft tissue extensibility and allowing for proper ROM for normal function with self-care, mobility, lifting and ambulation. Modalities: n/a    Charges:  Timed Code Treatment Minutes: 61'   Total Treatment Minutes:  60'   BWC:  TE TIME:  NMR TIME:  MANUAL TIME:  UNTIMED MINUTES:   -  -  -  -      [] EVAL (LOW) 60294 (typically 20 minutes face-to-face)  [] EVAL (MOD) 14269 (typically 30 minutes face-to-face)  [] EVAL (HIGH) 35442 (typically 45 minutes face-to-face)  [] RE-EVAL     [x] FJ(13133) x   2  [] IONTO  [x] NMR (58078) x  1   [x] VASO  [x] Manual (54604) x  1   [] Other:  [] TA x      [] Mech Traction (01255)  [] ES(attended) (23500)      [] ES (un) (47426):    ASSESSMENT:  See eval    GOALS:   Short Term Goals: To be achieved in: 2 weeks  1. Independent in HEP and progression per patient tolerance, in order to prevent re-injury. [] Progressing: [] Met: [] Not Met: [] Adjusted   2. Patient will have a decrease in pain to facilitate improvement in movement, function, and ADLs as indicated by Functional Deficits. [] Progressing: [] Met: [] Not Met: [] Adjusted     Long Term Goals: To be achieved in: 12 weeks  1. Disability index score of 20% or less for the LEFS to assist with reaching prior level of function. [] Progressing: [] Met: [] Not Met: [] Adjusted   2.  Patient will demonstrate increased AROM of R Knee from 0 -120 °  To all for ease with squatting when lifting items at work to allow for proper joint functioning as indicated by patients Functional Deficits. [] Progressing: [] Met: [] Not Met: [] Adjusted   3. Patient will demonstrate an increase in strength of R Knee/Hip to 5/5 to be able to squat, lift, and stand for at least 8 hours at work. [] Progressing: [] Met: [] Not Met: [] Adjusted   4. Patient will return demonstrate improved SLS on LE to >/ 10 seconds to decreased fall risk or risk injury during the work day when having to shift weight to one side when reaching for items. [] Progressing: [] Met: [] Not Met: [] Adjusted  5. Patient will ambulate without AD with proper heel to toe sequencing gait pattern   [] Progressing: [] Met: [] Not Met: [] Adjusted  6. Patient will have 0/10 pain with ADL's.  [] Progressing: [] Met: [] Not Met: [] Adjusted   7. Patient stated goal: To return to PLOF and walk without pain. [] Progressing: [] Met: [] Not Met: [] Adjusted     Overall Progression Towards Functional goals/ Treatment Progress Update:  [] Patient is progressing as expected towards functional goals listed. [] Progression is slowed due to complexities/Impairments listed. [] Progression has been slowed due to co-morbidities.   [x] Plan just implemented, too soon to assess goals progression <30days   [] Goals require adjustment due to lack of progress  [] Patient is not progressing as expected and requires additional follow up with physician  [] Other    Prognosis for POC: [x] Good [] Fair  [] Poor    Patient requires continued skilled intervention: [x] Yes  [] No    Treatment/Activity Tolerance:  [x] Patient able to complete treatment  [] Patient limited by fatigue  [] Patient limited by pain    [] Patient limited by other medical complications  [] Other:     Return to Play: (if applicable)   []  Stage 1: Intro to Strength   []  Stage 2: Return to Run and Strength   []  Stage 3: Return to Jump and Strength   []  Stage 4: Dynamic Strength and

## 2020-09-09 ENCOUNTER — HOSPITAL ENCOUNTER (OUTPATIENT)
Dept: PHYSICAL THERAPY | Age: 50
Setting detail: THERAPIES SERIES
Discharge: HOME | End: 2020-09-09
Payer: COMMERCIAL

## 2020-09-09 PROCEDURE — 97140 MANUAL THERAPY 1/> REGIONS: CPT

## 2020-09-09 PROCEDURE — 97110 THERAPEUTIC EXERCISES: CPT

## 2020-09-09 PROCEDURE — 97112 NEUROMUSCULAR REEDUCATION: CPT

## 2020-09-09 NOTE — FLOWSHEET NOTE
[facility], [address] [place] [address] Carlos Escamilla, [facility]    Physical Therapy Treatment Note/ Progress Report:     Date:  2020    Patient Name:  Bucky Pop    :  1970  MRN: [id number]  Restrictions/Precautions:    Medical/Treatment Diagnosis Information:  · Diagnosis: Acute Medial Meniscus Tear R Knee s/p R Partial Medial Meniscectomy  2020  · Treatment Diagnosis: G65.872X  Insurance/Certification information:  PT Insurance Information: [facility]  Physician Information:  Referring Practitioner: Faraz Samaniego  Has the plan of care been signed (Y/N):        []  Yes  [x]  No     Date of Patient follow up with Physician:     Is this a Progress Report:     []  Yes  [x]  No      If Yes:  Date Range for reporting period:  Initial Eval: 2020  Beginnin2020 --- Ending: 10/1/2020    Progress report will be due (10 Rx or 30 days whichever is less): 8616     Recertification will be due (POC Duration  / 90 days whichever is less): 2020      Visit # Insurance Allowable Auth Required   In Person 3 30 visits ( if more then 30 visits needed Med Review required) []  Yes     []  No    Tele Health -  []  Yes     []  No    Total 3       Functional Scale: LEFS: 70% (Score: 24/80)   Date assessed: 2020      Latex Allergy:  [x]NO      []YES  Preferred Language for Healthcare:   [x]English       []other:    Pain level:  3/10     SUBJECTIVE:  Patient reports having been able to ascend and descend up to 6 steps this weekend and had minimal difficulty     OBJECTIVE: See eval   Observation:  · Test measurements:  AAROM Knee flexion 100 °     RESTRICTIONS/PRECAUTIONS: Partial Med Meniscectomy with Chondroplasty        Exercises/Interventions:   Therapeutic Ex (93042)  Therapeutic Activity (50840)  NMR re-education (56123) Sets/Reps Notes/CUES   Bike 5' L2        Quad Sets 15 x 5\"    Heel Slides 15 x 5\"    SLR 3 x 10    SSLR 3 x 10     HL Hip Add 20 x 5\"     HL Hip Abd 3 Way 15 x 5\" Red TBand Heel Prop 5 mins    SL Clamshell 20 x 5\" Green TBand   Bridging  20 x 5\" Green TBand             Long Sit Gastroc Stretch 3 x 30\"    Long Sit HS stretch 3 x 30\"         Slant Board 3 x 30\"    Standing HR/TR 30 x ea    Standing Hip Marches/Curls 2 x 15  ea R, L    Standing Hip Abd/Ext 15 x ea R ,  10 x  L         Gait Training 5'                                  Manual Intervention (21081)     Patella Mobs 5'    PROM Knee Flex/Ext 5'                                            Patient Education 5'  Pt ed with HEP and progression of PT tx       Therapeutic Exercise and NMR EXR  [x] (76195) Provided verbal/tactile cueing for activities related to strengthening, flexibility, endurance, ROM for improvements in LE, proximal hip, and core control with self care, mobility, lifting, ambulation. [x] (88469) Provided verbal/tactile cueing for activities related to improving balance, coordination, kinesthetic sense, posture, motor skill, proprioception to assist with LE, proximal hip, and core control in self-care, mobility, lifting, ambulation and eccentric single leg control. NMR and Therapeutic Activities:    [x] (50038 or 56560) Provided verbal/tactile cueing for activities related to improving balance, coordination, kinesthetic sense, posture, motor skill, proprioception and motor activation to allow for proper function of core, proximal hip and LE with self-care and ADLs and functional mobility.    [x] (85640) Gait Re-education- Provided training and instruction to the patient for proper LE, core and proximal hip recruitment and positioning and eccentric body weight control with ambulation re-education including up and down stairs     Home Exercise Program:    [x] (28365) Reviewed/Progressed HEP activities related to strengthening, flexibility, endurance, ROM of core, proximal hip and LE for functional self-care, mobility, lifting and ambulation/stair navigation   [x] (61895) Reviewed/Progressed HEP activities related to improving balance, coordination, kinesthetic sense, posture, motor skill, proprioception of core, proximal hip and LE for self-care, mobility, lifting, and ambulation/stair navigation      Manual Treatments:  PROM / STM / Oscillations-Mobs:  G-I, II, III, IV (PA's, Inf., Post.)  [x] (56551) Provided manual therapy to mobilize LE, proximal hip and/or LS spine soft tissue/joints for the purpose of modulating pain, promoting relaxation, increasing ROM, reducing/eliminating soft tissue swelling/inflammation/restriction, improving soft tissue extensibility and allowing for proper ROM for normal function with self-care, mobility, lifting and ambulation. Modalities: n/a    Charges:  Timed Code Treatment Minutes: 54'   Total Treatment Minutes:  54'   [facility]:  [facility] TIME:  MANUAL TIME:  UNTIMED MINUTES:   -  -  -  -      [] EVAL (LOW) 24329 (typically 20 minutes face-to-face)  [] EVAL (MOD) 50449 (typically 30 minutes face-to-face)  [] EVAL (HIGH) 58105 (typically 45 minutes face-to-face)  [] RE-EVAL     [x] RL(88522) x   2  [] IONTO  [x] NMR (11155) x  1   [] VASO  [x] Manual (67755) x  1   [] Other:  [] TA x      [] Mech Traction (20342)  [] ES(attended) (44057)      [] ES (un) (26441):    ASSESSMENT:  See eval    GOALS:   Short Term Goals: To be achieved in: 2 weeks  1. Independent in HEP and progression per patient tolerance, in order to prevent re-injury. [] Progressing: [] Met: [] Not Met: [] Adjusted   2. Patient will have a decrease in pain to facilitate improvement in movement, function, and ADLs as indicated by Functional Deficits. [] Progressing: [] Met: [] Not Met: [] Adjusted     Long Term Goals: To be achieved in: 12 weeks  1. Disability index score of 20% or less for the LEFS to assist with reaching prior level of function. [] Progressing: [] Met: [] Not Met: [] Adjusted   2.  Patient will demonstrate increased AROM of R Knee from 0 -120 °  To all for ease with squatting when lifting items at work to allow for proper joint functioning as indicated by patients Functional Deficits. [] Progressing: [] Met: [] Not Met: [] Adjusted   3. Patient will demonstrate an increase in strength of R Knee/Hip to 5/5 to be able to squat, lift, and stand for at least 8 hours at work. [] Progressing: [] Met: [] Not Met: [] Adjusted   4. Patient will return demonstrate improved SLS on LE to >/ 10 seconds to decreased fall risk or risk injury during the work day when having to shift weight to one side when reaching for items. [] Progressing: [] Met: [] Not Met: [] Adjusted  5. Patient will ambulate without AD with proper heel to toe sequencing gait pattern   [] Progressing: [] Met: [] Not Met: [] Adjusted  6. Patient will have 0/10 pain with ADL's.  [] Progressing: [] Met: [] Not Met: [] Adjusted   7. Patient stated goal: To return to PLOF and walk without pain. [] Progressing: [] Met: [] Not Met: [] Adjusted     Overall Progression Towards Functional goals/ Treatment Progress Update:  [] Patient is progressing as expected towards functional goals listed. [] Progression is slowed due to complexities/Impairments listed. [] Progression has been slowed due to co-morbidities.   [x] Plan just implemented, too soon to assess goals progression <30days   [] Goals require adjustment due to lack of progress  [] Patient is not progressing as expected and requires additional follow up with physician  [] Other    Prognosis for POC: [x] Good [] Fair  [] Poor    Patient requires continued skilled intervention: [x] Yes  [] No    Treatment/Activity Tolerance:  [x] Patient able to complete treatment  [] Patient limited by fatigue  [] Patient limited by pain    [] Patient limited by other medical complications  [] Other:     Return to Play: (if applicable)   []  Stage 1: Intro to Strength   []  Stage 2: Return to Run and Strength   []  Stage 3: Return to Jump and Strength   []  Stage 4: Dynamic Strength and Agility   [] Stage 5: Sport Specific Training     []  Ready to Return to Play, Meets All Above Stages   []  Not Ready for Return to Sports   Comments:                         PLAN: See eval  [x] Continue per plan of care [] Alter current plan (see comments above)  [] Plan of care initiated [] Hold pending MD visit [] Discharge    Electronically signed by:  Dominique Cyr PT    Note: If patient does not return for scheduled/ recommended follow up visits, this note will serve as a discharge from care along with most recent update on progress.

## 2020-09-11 ENCOUNTER — HOSPITAL ENCOUNTER (OUTPATIENT)
Dept: PHYSICAL THERAPY | Age: 50
Setting detail: THERAPIES SERIES
Discharge: HOME | End: 2020-09-11
Payer: COMMERCIAL

## 2020-09-15 ENCOUNTER — HOSPITAL ENCOUNTER (OUTPATIENT)
Dept: PHYSICAL THERAPY | Age: 50
Setting detail: THERAPIES SERIES
Discharge: HOME | End: 2020-09-15
Payer: COMMERCIAL

## 2020-09-15 PROCEDURE — 97140 MANUAL THERAPY 1/> REGIONS: CPT

## 2020-09-15 PROCEDURE — 97110 THERAPEUTIC EXERCISES: CPT

## 2020-09-15 PROCEDURE — 97112 NEUROMUSCULAR REEDUCATION: CPT

## 2020-09-15 NOTE — FLOWSHEET NOTE
20 x 5\" Green [employer]  20 x 5\" Green TBand             Long Sit Gastroc Stretch 3 x 30\"    Long Sit HS stretch 3 x 30\"         Slant Board 3 x 30\"    Standing HR/TR 30 x ea    Standing Hip Marches/Curls 2 x 15  ea R, L    SLS 5 x 10\"    [address] [place] [facility]; Hip Abd/Ext 20 x ea R  15 x R, L 45#  30#        Gait Training 5'         Leg Press 30 x  60#                       Manual Intervention (74250)     Patella Mobs 5'    PROM Knee Flex/Ext 5'                                            Patient Education 5'  Pt ed with HEP and progression of PT tx       Therapeutic Exercise and NMR EXR  [x] (97134) Provided verbal/tactile cueing for activities related to strengthening, flexibility, endurance, ROM for improvements in LE, proximal hip, and core control with self care, mobility, lifting, ambulation. [x] (32556) Provided verbal/tactile cueing for activities related to improving balance, coordination, kinesthetic sense, posture, motor skill, proprioception to assist with LE, proximal hip, and core control in self-care, mobility, lifting, ambulation and eccentric single leg control. NMR and Therapeutic Activities:    [x] (12658 or 98265) Provided verbal/tactile cueing for activities related to improving balance, coordination, kinesthetic sense, posture, motor skill, proprioception and motor activation to allow for proper function of core, proximal hip and LE with self-care and ADLs and functional mobility.    [x] (87028) Gait Re-education- Provided training and instruction to the patient for proper LE, core and proximal hip recruitment and positioning and eccentric body weight control with ambulation re-education including up and down stairs     Home Exercise Program:    [x] (90451) Reviewed/Progressed HEP activities related to strengthening, flexibility, endurance, ROM of core, proximal hip and LE for functional self-care, mobility, lifting and ambulation/stair navigation   [x] (44556) Reviewed/Progressed HEP activities items at work to allow for proper joint functioning as indicated by patients Functional Deficits. [] Progressing: [] Met: [] Not Met: [] Adjusted   3. Patient will demonstrate an increase in strength of R Knee/Hip to 5/5 to be able to squat, lift, and stand for at least 8 hours at work. [] Progressing: [] Met: [] Not Met: [] Adjusted   4. Patient will return demonstrate improved SLS on LE to >/ 10 seconds to decreased fall risk or risk injury during the work day when having to shift weight to one side when reaching for items. [] Progressing: [] Met: [] Not Met: [] Adjusted  5. Patient will ambulate without AD with proper heel to toe sequencing gait pattern   [] Progressing: [] Met: [] Not Met: [] Adjusted  6. Patient will have 0/10 pain with ADL's.  [] Progressing: [] Met: [] Not Met: [] Adjusted   7. Patient stated goal: To return to PLOF and walk without pain. [] Progressing: [] Met: [] Not Met: [] Adjusted     Overall Progression Towards Functional goals/ Treatment Progress Update:  [x] Patient is progressing as expected towards functional goals listed. [] Progression is slowed due to complexities/Impairments listed. [] Progression has been slowed due to co-morbidities.   [] Plan just implemented, too soon to assess goals progression <30days   [] Goals require adjustment due to lack of progress  [] Patient is not progressing as expected and requires additional follow up with physician  [] Other    Prognosis for POC: [x] Good [] Fair  [] Poor    Patient requires continued skilled intervention: [x] Yes  [] No    Treatment/Activity Tolerance:  [x] Patient able to complete treatment  [] Patient limited by fatigue  [] Patient limited by pain    [] Patient limited by other medical complications  [] Other:     Return to Play: (if applicable)   []  Stage 1: Intro to Strength   []  Stage 2: Return to Run and Strength   []  Stage 3: Return to Jump and Strength   []  Stage 4: Dynamic Strength and Agility   []

## 2020-09-17 ENCOUNTER — TELEPHONE (OUTPATIENT)
Dept: ORTHOPEDIC SURGERY | Age: 50
End: 2020-09-17

## 2020-09-17 ENCOUNTER — HOSPITAL ENCOUNTER (OUTPATIENT)
Dept: PHYSICAL THERAPY | Age: 50
Setting detail: THERAPIES SERIES
Discharge: HOME | End: 2020-09-17
Payer: COMMERCIAL

## 2020-09-17 PROCEDURE — 97112 NEUROMUSCULAR REEDUCATION: CPT

## 2020-09-17 PROCEDURE — 97110 THERAPEUTIC EXERCISES: CPT

## 2020-09-17 PROCEDURE — 97140 MANUAL THERAPY 1/> REGIONS: CPT

## 2020-09-22 ENCOUNTER — HOSPITAL ENCOUNTER (OUTPATIENT)
Dept: PHYSICAL THERAPY | Age: 50
Setting detail: THERAPIES SERIES
Discharge: HOME | End: 2020-09-22
Payer: COMMERCIAL

## 2020-09-22 PROCEDURE — 97110 THERAPEUTIC EXERCISES: CPT

## 2020-09-22 PROCEDURE — 97112 NEUROMUSCULAR REEDUCATION: CPT

## 2020-09-22 NOTE — FLOWSHEET NOTE
[facility], [address] [place] [address] Carlos Escamilla, [id number]    Physical Therapy Treatment Note/ Progress Report:     Date:  2020    Patient Name:  Jeronimo Lo    :  1970  MRN: [id number]  Restrictions/Precautions:    Medical/Treatment Diagnosis Information:  · Diagnosis: Acute Medial Meniscus Tear R Knee s/p R Partial Medial Meniscectomy  2020  · Treatment Diagnosis: U84.049F  Insurance/Certification information:  PT Insurance Information: [facility]  Physician Information:  Referring Practitioner: Shahana Hawthorne  Has the plan of care been signed (Y/N):        []  Yes  [x]  No     Date of Patient follow up with Physician:  10/6/2020    Is this a Progress Report:     []  Yes  [x]  No      If Yes:  Date Range for reporting period:  Initial Eval: 2020  Beginnin2020 --- Ending: 10/1/2020    Progress report will be due (10 Rx or 30 days whichever is less):      Recertification will be due (POC Duration  / 90 days whichever is less): 2020      Visit # Insurance Allowable Auth Required   In Person 6 30 visits ( if more then 30 visits needed Med Review required) []  Yes     []  No    Tele Health -  []  Yes     []  No    Total 6       Functional Scale: LEFS: 70% (Score: 24/80)   Date assessed: 2020      Latex Allergy:  [x]NO      []YES  Preferred Language for Healthcare:   [x]English       []other:    Pain level:  3/10     SUBJECTIVE:  Patient reports feeling pretty good, still hurts on the outside of the knee. Pt reports having increased difficulty with her back.     OBJECTIVE: See eval   Observation:  · Test measurements:  AAROM Knee flexion 110 °     RESTRICTIONS/PRECAUTIONS: Partial Med Meniscectomy with Chondroplasty        Exercises/Interventions:   Therapeutic Ex (18197)  Therapeutic Activity (81825)  NMR re-education (43424) Sets/Reps Notes/CUES   Bike 5' L5        Quad Sets 15 x 5\"    Heel Slides 15 x 5\"    SLR 3 x 10 1#   SSLR 3 x 10  1#   HL Hip Add 20 x 5\"     HL Hip Abd 3 Way 20 x 5\" Green TBand   Heel Prop 5 mins    SL Clamshell 20 x 5\" Green TBand   Bridging  20 x 5\" Green TBand        LAQ/SAQ 30 x  2#        Long Sit Gastroc Stretch 3 x 30\"    Long Sit HS stretch 3 x 30\"         Slant Board 3 x 30\"    Standing HR/TR 30 x ea    Standing Hip Marches/Curls 2 x 15  ea R, L Airex   SLS 10 x 10\"    [address] [place]; Hip Abd/Ext 30 x ea R  20 x R, L 45#  45#   Leg Press  30 x 80#  20 x 60#    FSU 20 x  6\"   Lateral Step Up 15 x ea 6\"        Gait Training 5'                                  Manual Intervention (13777)                                                   Patient Education 5'  Pt ed with HEP and progression of PT tx       Therapeutic Exercise and NMR EXR  [x] (91499) Provided verbal/tactile cueing for activities related to strengthening, flexibility, endurance, ROM for improvements in LE, proximal hip, and core control with self care, mobility, lifting, ambulation. [x] (59194) Provided verbal/tactile cueing for activities related to improving balance, coordination, kinesthetic sense, posture, motor skill, proprioception to assist with LE, proximal hip, and core control in self-care, mobility, lifting, ambulation and eccentric single leg control. NMR and Therapeutic Activities:    [x] (12459 or 74624) Provided verbal/tactile cueing for activities related to improving balance, coordination, kinesthetic sense, posture, motor skill, proprioception and motor activation to allow for proper function of core, proximal hip and LE with self-care and ADLs and functional mobility.    [x] (83535) Gait Re-education- Provided training and instruction to the patient for proper LE, core and proximal hip recruitment and positioning and eccentric body weight control with ambulation re-education including up and down stairs     Home Exercise Program:    [x] (24996) Reviewed/Progressed HEP activities related to strengthening, flexibility, endurance, ROM of core, proximal hip and LE for functional self-care, mobility, lifting and ambulation/stair navigation   [x] (99048) Reviewed/Progressed HEP activities related to improving balance, coordination, kinesthetic sense, posture, motor skill, proprioception of core, proximal hip and LE for self-care, mobility, lifting, and ambulation/stair navigation      Manual Treatments:  PROM / STM / Oscillations-Mobs:  G-I, II, III, IV (PA's, Inf., Post.)  [x] (82881) Provided manual therapy to mobilize LE, proximal hip and/or LS spine soft tissue/joints for the purpose of modulating pain, promoting relaxation, increasing ROM, reducing/eliminating soft tissue swelling/inflammation/restriction, improving soft tissue extensibility and allowing for proper ROM for normal function with self-care, mobility, lifting and ambulation. Modalities: n/a    Charges:  Timed Code Treatment Minutes: 61'   Total Treatment Minutes:  60'   BWC:  TE TIME:  NMR TIME:  MANUAL TIME:  UNTIMED MINUTES:   -  -  -  -      [] EVAL (LOW) 13170 (typically 20 minutes face-to-face)  [] EVAL (MOD) 24634 (typically 30 minutes face-to-face)  [] EVAL (HIGH) 08844 (typically 45 minutes face-to-face)  [] RE-EVAL     [x] XI(91135) x   3  [] IONTO  [x] NMR (27921) x  1   [] VASO  [] Manual (96863) x     [] Other:  [] TA x      [] Mech Traction (20009)  [] ES(attended) (50788)      [] ES (un) (25691):    ASSESSMENT:  See eval    GOALS:   Short Term Goals: To be achieved in: 2 weeks  1. Independent in HEP and progression per patient tolerance, in order to prevent re-injury. [] Progressing: [] Met: [] Not Met: [] Adjusted   2. Patient will have a decrease in pain to facilitate improvement in movement, function, and ADLs as indicated by Functional Deficits. [] Progressing: [] Met: [] Not Met: [] Adjusted     Long Term Goals: To be achieved in: 12 weeks  1. Disability index score of 20% or less for the LEFS to assist with reaching prior level of function.    [] Progressing: [] Met: [] Not Met: [] Return to Run and Strength   []  Stage 3: Return to Jump and Strength   []  Stage 4: Dynamic Strength and Agility   []  Stage 5: Sport Specific Training     []  Ready to Return to Play, Meets All Above Stages   []  Not Ready for Return to Sports   Comments:                         PLAN: See eval  [x] Continue per plan of care [] Alter current plan (see comments above)  [] Plan of care initiated [] Hold pending MD visit [] Discharge    Electronically signed by:  Peyman Brenner PT    Note: If patient does not return for scheduled/ recommended follow up visits, this note will serve as a discharge from care along with most recent update on progress.

## 2020-09-24 ENCOUNTER — HOSPITAL ENCOUNTER (OUTPATIENT)
Dept: PHYSICAL THERAPY | Age: 50
Setting detail: THERAPIES SERIES
Discharge: HOME | End: 2020-09-24
Payer: COMMERCIAL

## 2020-09-24 PROCEDURE — 97110 THERAPEUTIC EXERCISES: CPT

## 2020-09-24 PROCEDURE — 97112 NEUROMUSCULAR REEDUCATION: CPT

## 2020-09-24 NOTE — FLOWSHEET NOTE
[facility], [address] [place] [address] Carlos Esc[place], [facility]    Physical Therapy Treatment Note/ Progress Report:     Date:  2020    Patient Name:  Shania Grier    :  1970  MRN: [id number]  Restrictions/Precautions:    Medical/Treatment Diagnosis Information:  · Diagnosis: Acute Medial Meniscus Tear R Knee s/p R Partial Medial Meniscectomy  2020  · Treatment Diagnosis: U39.485L  Insurance/Certification information:  PT Insurance Information: [facility]  Physician Information:  Referring Practitioner: Humaira Mcclellan  Has the plan of care been signed (Y/N):        []  Yes  [x]  No     Date of Patient follow up with Physician:  10/6/2020    Is this a Progress Report:     []  Yes  [x]  No      If Yes:  Date Range for reporting period:  Initial Eval: 2020  Beginnin2020 --- Ending: 10/1/2020    Progress report will be due (10 Rx or 30 days whichever is less):      Recertification will be due (POC Duration  / 90 days whichever is less): 2020      Visit # Insurance Allowable Auth Required   In Person 7 30 visits ( if more then 30 visits needed Med Review required) []  Yes     []  No    Tele Health -  []  Yes     []  No    Total 7       Functional Scale: LEFS: 70% (Score: 24/80)   Date assessed: 2020      Latex Allergy:  [x]NO      []YES  Preferred Language for Healthcare:   [x]English       []other:    Pain level:  3/10     SUBJECTIVE:  Patient reports feeling pretty good, still hurts on the outside of the knee. Pt reports having increased difficulty with her back.     OBJECTIVE: See eval   Observation:  · Test measurements:  AAROM Knee flexion 110 °     RESTRICTIONS/PRECAUTIONS: Partial Med Meniscectomy with Chondroplasty        Exercises/Interventions:   Therapeutic Ex (36100)  Therapeutic Activity (73274)  NMR re-education (38151) Sets/Reps Notes/CUES   Bike 5' L5        Quad Sets 15 x 5\"    Heel Slides 15 x 5\"    SLR 3 x 10 2#   SSLR 3 x 10  2#   HL Hip Add 20 x 5\"     HL Hip Abd 3 Way 20 x 5\" Green TBand   Heel Prop 5 mins    SL Clamshell 20 x 5\" Green TBand   Bridging  20 x 5\" Green TBand        LAQ/SAQ 30 x  2#        Long Sit Gastroc Stretch 3 x 30\"    Long Sit HS stretch 3 x 30\"         Slant Board 3 x 30\"    Standing HR/TR 30 x ea    Standing Hip Marches/Curls 2 x 15  ea R, L Airex   SLS 10 x 10\"    [facility] TKE; Hip Abd/Ext 30 x ea R  20 x R, L 45#  45#   Leg Press  30 x 80#  20 x 60#    FSU 20 x  6\"  2#   Lateral Step Up 15 x ea 6\"  2#        Gait Training 5'                                  Manual Intervention (41953)                                                   Patient Education 5'  Pt ed with HEP and progression of PT tx       Therapeutic Exercise and NMR EXR  [x] (51813) Provided verbal/tactile cueing for activities related to strengthening, flexibility, endurance, ROM for improvements in LE, proximal hip, and core control with self care, mobility, lifting, ambulation. [x] (26249) Provided verbal/tactile cueing for activities related to improving balance, coordination, kinesthetic sense, posture, motor skill, proprioception to assist with LE, proximal hip, and core control in self-care, mobility, lifting, ambulation and eccentric single leg control. NMR and Therapeutic Activities:    [x] (68210 or 01353) Provided verbal/tactile cueing for activities related to improving balance, coordination, kinesthetic sense, posture, motor skill, proprioception and motor activation to allow for proper function of core, proximal hip and LE with self-care and ADLs and functional mobility.    [x] (06552) Gait Re-education- Provided training and instruction to the patient for proper LE, core and proximal hip recruitment and positioning and eccentric body weight control with ambulation re-education including up and down stairs     Home Exercise Program:    [x] (84012) Reviewed/Progressed HEP activities related to strengthening, flexibility, endurance, ROM of core, proximal hip and LE for functional self-care, mobility, lifting and ambulation/stair navigation   [x] (10424) Reviewed/Progressed HEP activities related to improving balance, coordination, kinesthetic sense, posture, motor skill, proprioception of core, proximal hip and LE for self-care, mobility, lifting, and ambulation/stair navigation      Manual Treatments:  PROM / STM / Oscillations-Mobs:  G-I, II, III, IV (PA's, Inf., Post.)  [x] (93855) Provided manual therapy to mobilize LE, proximal hip and/or LS spine soft tissue/joints for the purpose of modulating pain, promoting relaxation, increasing ROM, reducing/eliminating soft tissue swelling/inflammation/restriction, improving soft tissue extensibility and allowing for proper ROM for normal function with self-care, mobility, lifting and ambulation. Modalities: n/a    Charges:  Timed Code Treatment Minutes: 61'   Total Treatment Minutes:  60'   BWC:  TE TIME:  NMR TIME:  MANUAL TIME:  UNTIMED MINUTES:   -  -  -  -      [] EVAL (LOW) 46280 (typically 20 minutes face-to-face)  [] EVAL (MOD) 22772 (typically 30 minutes face-to-face)  [] EVAL (HIGH) 19048 (typically 45 minutes face-to-face)  [] RE-EVAL     [x] KB(59449) x   3  [] IONTO  [x] NMR (88450) x  1   [] VASO  [] Manual (33349) x     [] Other:  [] TA x      [] Mech Traction (09703)  [] ES(attended) (84160)      [] ES (un) (48763):    ASSESSMENT:  See eval    GOALS:   Short Term Goals: To be achieved in: 2 weeks  1. Independent in HEP and progression per patient tolerance, in order to prevent re-injury. [x] Progressing: [] Met: [] Not Met: [] Adjusted   2. Patient will have a decrease in pain to facilitate improvement in movement, function, and ADLs as indicated by Functional Deficits. [x] Progressing: [] Met: [] Not Met: [] Adjusted     Long Term Goals: To be achieved in: 12 weeks  1. Disability index score of 20% or less for the LEFS to assist with reaching prior level of function.    [x] Progressing: [] Met: [] Not Met: [] Adjusted   2. Patient will demonstrate increased AROM of R Knee from 0 -120 °  To all for ease with squatting when lifting items at work to allow for proper joint functioning as indicated by patients Functional Deficits. [x] Progressing: [] Met: [] Not Met: [] Adjusted   3. Patient will demonstrate an increase in strength of R Knee/Hip to 5/5 to be able to squat, lift, and stand for at least 8 hours at work. [x] Progressing: [] Met: [] Not Met: [] Adjusted   4. Patient will return demonstrate improved SLS on LE to >/ 10 seconds to decreased fall risk or risk injury during the work day when having to shift weight to one side when reaching for items. [x] Progressing: [] Met: [] Not Met: [] Adjusted  5. Patient will ambulate without AD with proper heel to toe sequencing gait pattern   [x] Progressing: [] Met: [] Not Met: [] Adjusted  6. Patient will have 0/10 pain with ADL's. [x] Progressing: [] Met: [] Not Met: [] Adjusted   7. Patient stated goal: To return to PLOF and walk without pain. [x] Progressing: [] Met: [] Not Met: [] Adjusted     Overall Progression Towards Functional goals/ Treatment Progress Update:  [x] Patient is progressing as expected towards functional goals listed. [] Progression is slowed due to complexities/Impairments listed. [] Progression has been slowed due to co-morbidities.   [] Plan just implemented, too soon to assess goals progression <30days   [] Goals require adjustment due to lack of progress  [] Patient is not progressing as expected and requires additional follow up with physician  [] Other    Prognosis for POC: [x] Good [] Fair  [] Poor    Patient requires continued skilled intervention: [x] Yes  [] No    Treatment/Activity Tolerance:  [x] Patient able to complete treatment  [] Patient limited by fatigue  [] Patient limited by pain    [] Patient limited by other medical complications  [] Other:     Return to Play: (if applicable)   []  Stage 1: Intro to Strength   [] Stage 2: Return to Run and Strength   []  Stage 3: Return to Jump and Strength   []  Stage 4: Dynamic Strength and Agility   []  Stage 5: Sport Specific Training     []  Ready to Return to Play, Meets All Above Stages   []  Not Ready for Return to Sports   Comments:                         PLAN: See eval  [x] Continue per plan of care [] Alter current plan (see comments above)  [] Plan of care initiated [] Hold pending MD visit [] Discharge    Electronically signed by:  Tal Vasquez PT    Note: If patient does not return for scheduled/ recommended follow up visits, this note will serve as a discharge from care along with most recent update on progress.

## 2020-09-29 ENCOUNTER — HOSPITAL ENCOUNTER (OUTPATIENT)
Dept: PHYSICAL THERAPY | Age: 50
Setting detail: THERAPIES SERIES
Discharge: HOME | End: 2020-09-29
Payer: COMMERCIAL

## 2020-09-29 PROCEDURE — 97112 NEUROMUSCULAR REEDUCATION: CPT | Performed by: SPECIALIST/TECHNOLOGIST

## 2020-09-29 PROCEDURE — 97110 THERAPEUTIC EXERCISES: CPT | Performed by: SPECIALIST/TECHNOLOGIST

## 2020-09-29 NOTE — FLOWSHEET NOTE
[facility], [address] [place], [address]    Physical Therapy Treatment Note/ Progress Report:     Date:  2020    Patient Name:  Leticia Hallman    :  1970  MRN: [id number]  Restrictions/Precautions:    Medical/Treatment Diagnosis Information:  · Diagnosis: Acute Medial Meniscus Tear R Knee s/p R Partial Medial Meniscectomy  2020  · Treatment Diagnosis: J84.911J  Insurance/Certification information:  PT Insurance Information: [facility]  Physician Information:  Referring Practitioner: Estephanie Rebolledo  Has the plan of care been signed (Y/N):        []  Yes  [x]  No     Date of Patient follow up with Physician:  10/6/2020    Is this a Progress Report:     []  Yes  [x]  No      If Yes:  Date Range for reporting period:  Initial Eval: 2020  Beginnin2020 --- Ending: 10/1/2020    Progress report will be due (10 Rx or 30 days whichever is less):      Recertification will be due (POC Duration  / 90 days whichever is less): 2020      Visit # Insurance Allowable Auth Required   In Person 8 30 visits ( if more then 30 visits needed Med Review required) []  Yes     []  No    Tele Health -  []  Yes     []  No    Total 8       Functional Scale: LEFS: 70% (Score: 24/80)   Date assessed: 2020      Latex Allergy:  [x]NO      []YES  Preferred Language for Healthcare:   [x]English       []other:    Pain level:  3-4/10     SUBJECTIVE:  Patient reports that her knee is a little more sore. Notes she did a lot of walking over the weekend and may have over done it.      OBJECTIVE: See eval   Observation:  · Test measurements:  AAROM Knee flexion  115° ()    RESTRICTIONS/PRECAUTIONS: Partial Med Meniscectomy with Chondroplasty        Exercises/Interventions:   Therapeutic Ex (77505)  Therapeutic Activity (18615)  NMR re-education (53932) Sets/Reps Notes/CUES   Bike 5' L5        Quad Sets 15 x 5\"    Heel Slides 15 x 5\"    SLR 3 x 10 2#   SSLR 3 x 10  2#   HL Hip Add 20 x 5\" HL Hip Abd 3 Way 20 x 5\" Green TBand   Heel Prop 5 mins    SL Clamshell 20 x 5\" Green TBand   Bridging  20 x 5\" Green TBand        LAQ/SAQ 30 x  2#        Long Sit Gastroc Stretch 3 x 30\"    Long Sit HS stretch 3 x 30\"         Slant Board 3 x 30\"    Standing HR/TR 30 x ea    Standing Hip Marches/Curls 2 x 15  ea R, L Airex   SLS 10 x 10\" Airex   ABBY TKE; Hip Abd/Ext    TKE 30 x ea R  20 x R, L  X 20 c 3\" hold 45#  45#  60#   Leg Press  30 x 80#  20 x 60#    FSU 20 x  6\"  2#   Lateral Step Up 20 x ea 6\"  2#                                         Manual Intervention (87880)                                                   Patient Education 5'  Pt ed with HEP and progression of PT tx       Therapeutic Exercise and NMR EXR  [x] (50081) Provided verbal/tactile cueing for activities related to strengthening, flexibility, endurance, ROM for improvements in LE, proximal hip, and core control with self care, mobility, lifting, ambulation. [x] (12580) Provided verbal/tactile cueing for activities related to improving balance, coordination, kinesthetic sense, posture, motor skill, proprioception to assist with LE, proximal hip, and core control in self-care, mobility, lifting, ambulation and eccentric single leg control. NMR and Therapeutic Activities:    [x] (36470 or 46167) Provided verbal/tactile cueing for activities related to improving balance, coordination, kinesthetic sense, posture, motor skill, proprioception and motor activation to allow for proper function of core, proximal hip and LE with self-care and ADLs and functional mobility.    [x] (24253) Gait Re-education- Provided training and instruction to the patient for proper LE, core and proximal hip recruitment and positioning and eccentric body weight control with ambulation re-education including up and down stairs     Home Exercise Program:    [x] (32209) Reviewed/Progressed HEP activities related to strengthening, flexibility, endurance, ROM of core, proximal hip and LE for functional self-care, mobility, lifting and ambulation/stair navigation   [x] (76835) Reviewed/Progressed HEP activities related to improving balance, coordination, kinesthetic sense, posture, motor skill, proprioception of core, proximal hip and LE for self-care, mobility, lifting, and ambulation/stair navigation      Manual Treatments:  PROM / STM / Oscillations-Mobs:  G-I, II, III, IV (PA's, Inf., Post.)  [x] (89903) Provided manual therapy to mobilize LE, proximal hip and/or LS spine soft tissue/joints for the purpose of modulating pain, promoting relaxation, increasing ROM, reducing/eliminating soft tissue swelling/inflammation/restriction, improving soft tissue extensibility and allowing for proper ROM for normal function with self-care, mobility, lifting and ambulation. Modalities: n/a    Charges:  Timed Code Treatment Minutes: 61'   Total Treatment Minutes:  60'   BWC:  TE TIME:  NMR TIME:  MANUAL TIME:  UNTIMED MINUTES:   -  -  -  -      [] EVAL (LOW) 92857 (typically 20 minutes face-to-face)  [] EVAL (MOD) 42596 (typically 30 minutes face-to-face)  [] EVAL (HIGH) 99224 (typically 45 minutes face-to-face)  [] RE-EVAL     [x] OI(23344) x   3  [] IONTO  [x] NMR (21807) x  1   [] VASO  [] Manual (91009) x     [] Other:  [] TA x      [] Mech Traction (59075)  [] ES(attended) (45119)      [] ES (un) (35475):    ASSESSMENT:  See eval    GOALS:   Short Term Goals: To be achieved in: 2 weeks  1. Independent in HEP and progression per patient tolerance, in order to prevent re-injury. [x] Progressing: [] Met: [] Not Met: [] Adjusted   2. Patient will have a decrease in pain to facilitate improvement in movement, function, and ADLs as indicated by Functional Deficits. [x] Progressing: [] Met: [] Not Met: [] Adjusted     Long Term Goals: To be achieved in: 12 weeks  1. Disability index score of 20% or less for the LEFS to assist with reaching prior level of function.    [x] Progressing: [] Met: [] Not Met: [] Adjusted   2. Patient will demonstrate increased AROM of R Knee from 0 -120 °  To all for ease with squatting when lifting items at work to allow for proper joint functioning as indicated by patients Functional Deficits. [x] Progressing: [] Met: [] Not Met: [] Adjusted   3. Patient will demonstrate an increase in strength of R Knee/Hip to 5/5 to be able to squat, lift, and stand for at least 8 hours at work. [x] Progressing: [] Met: [] Not Met: [] Adjusted   4. Patient will return demonstrate improved SLS on LE to >/ 10 seconds to decreased fall risk or risk injury during the work day when having to shift weight to one side when reaching for items. [x] Progressing: [] Met: [] Not Met: [] Adjusted  5. Patient will ambulate without AD with proper heel to toe sequencing gait pattern   [x] Progressing: [] Met: [] Not Met: [] Adjusted  6. Patient will have 0/10 pain with ADL's. [x] Progressing: [] Met: [] Not Met: [] Adjusted   7. Patient stated goal: To return to PLOF and walk without pain. [x] Progressing: [] Met: [] Not Met: [] Adjusted     Overall Progression Towards Functional goals/ Treatment Progress Update:  [x] Patient is progressing as expected towards functional goals listed. [] Progression is slowed due to complexities/Impairments listed. [] Progression has been slowed due to co-morbidities.   [] Plan just implemented, too soon to assess goals progression <30days   [] Goals require adjustment due to lack of progress  [] Patient is not progressing as expected and requires additional follow up with physician  [] Other    Prognosis for POC: [x] Good [] Fair  [] Poor    Patient requires continued skilled intervention: [x] Yes  [] No    Treatment/Activity Tolerance:  [x] Patient able to complete treatment  [] Patient limited by fatigue  [] Patient limited by pain    [] Patient limited by other medical complications  [] Other:     Return to Play: (if applicable)   []

## 2020-10-01 ENCOUNTER — HOSPITAL ENCOUNTER (OUTPATIENT)
Dept: PHYSICAL THERAPY | Age: 50
Setting detail: THERAPIES SERIES
Discharge: HOME | End: 2020-10-01
Payer: COMMERCIAL

## 2020-10-01 PROCEDURE — 97110 THERAPEUTIC EXERCISES: CPT | Performed by: PHYSICAL THERAPY ASSISTANT

## 2020-10-01 PROCEDURE — 97112 NEUROMUSCULAR REEDUCATION: CPT | Performed by: PHYSICAL THERAPY ASSISTANT

## 2020-10-01 NOTE — FLOWSHEET NOTE
[facility], [address] [place] [address] Carlos Escamilla, [address]    Date: 10/1/2020          Patient Name; :  Dolly Elizondo; 1970   · Dx/ICD Code: Diagnosis: Acute Medial Meniscus Tear R Knee s/p R Partial Medial Meniscectomy  2020  Treatment Diagnosis: S80.0D    Physician: Dr. Adalid Herrera        Total PT Visits: 9     Measures Previous Current   Pain (0-10)  3/10   Disability %  LEFS 71 = 11%   ROM  0-120             Strength                 Specific Functional Improvements & Impressions:  Colleen Garcia states that she is improving. She does however have some lateral knee joint discomfort that she has noticed over the past few weeks. Her ROM and strength continue to progress. She currently feels 60% of normal limited primarily by endurance. Plan & Recommendations:  [x] Continue rehabilitation due to objective improvement and continued functional deficits with frequency and duration: 1-2 times a week for 3-4 weeks  [] Progress toward  []GAP, []Work Conditioning, []Independent HEP   [] Discharge due to   [] All goals achieved, [] Maximized \"medical necessity\" [] No subjective or objective improvements      Electronically signed by:  Anthony Reilly PTA / Carlos George, PT, DPT, OMT-C  [id number]      Therapy Plan of Care Re-Certification  This patient has been re-evaluated for physical therapy services and for therapy to continue, Medicare, Medicaid and other insurances require periodic physician review of the treatment plan.  Please review the above re-evaluation and verify that you agree with plan of care as established above by signing the attached document and return it to our office or note changes to established plan below  [] Follow treatment plan as above [] Discontinue physical therapy  [] Change plan to:                                 __________________________________________________    Physician Signature:____________________________________ Date:____________  By signing above, re-education ([id number]) Sets/Reps Notes/CUES   Bike 5' L5        Quad Sets 15 x 5\"    Heel Slides 15 x 5\"    SLR 3 x 10    SSLR 3 x 10    HL Hip Add 20 x 5\"     HL Hip Abd 3 Way 20 x 5\" Green TBand   Heel Prop 5 mins    SL Clamshell 20 x 5\" Green TBand   Bridging  20 x 5\" Green TBand        LAQ/SAQ 30 x  2#        Long Sit Gastroc Stretch 3 x 30\"    Long Sit HS stretch 3 x 30\"         Slant Board 3 x 30\"    Standing HR/TR 30 x ea    Standing Hip Marches/Curls 2# 2 x 15  ea R, L    SLS 10 x 10\" Airex   ABBY TKE; Hip Abd/Ext    TKE 30 x ea R  20 x R, L  X 20 c 3\" hold 45#  45#  60#   Leg Press  30 x 80#  20 x 60#    FSU 20 x  6\"  2#   Lateral Step Up 20 x ea 6\"  2#                                         Manual Intervention (19134)     Patella Mobs 5'    PROM Knee Flex/Ext 5'    Hip flexor stretch  5'                                       Patient Education 5'  Pt ed with HEP and progression of PT tx       Therapeutic Exercise and NMR EXR  [x] (20383) Provided verbal/tactile cueing for activities related to strengthening, flexibility, endurance, ROM for improvements in LE, proximal hip, and core control with self care, mobility, lifting, ambulation. [x] (72897) Provided verbal/tactile cueing for activities related to improving balance, coordination, kinesthetic sense, posture, motor skill, proprioception to assist with LE, proximal hip, and core control in self-care, mobility, lifting, ambulation and eccentric single leg control. NMR and Therapeutic Activities:    [x] (37473 or 20606) Provided verbal/tactile cueing for activities related to improving balance, coordination, kinesthetic sense, posture, motor skill, proprioception and motor activation to allow for proper function of core, proximal hip and LE with self-care and ADLs and functional mobility.    [x] (19010) Gait Re-education- Provided training and instruction to the patient for proper LE, core and proximal hip recruitment and positioning and eccentric body weight control with ambulation re-education including up and down stairs     Home Exercise Program:    [x] (39698) Reviewed/Progressed HEP activities related to strengthening, flexibility, endurance, ROM of core, proximal hip and LE for functional self-care, mobility, lifting and ambulation/stair navigation   [x] (61598) Reviewed/Progressed HEP activities related to improving balance, coordination, kinesthetic sense, posture, motor skill, proprioception of core, proximal hip and LE for self-care, mobility, lifting, and ambulation/stair navigation      Manual Treatments:  PROM / STM / Oscillations-Mobs:  G-I, II, III, IV (PA's, Inf., Post.)  [x] (64965) Provided manual therapy to mobilize LE, proximal hip and/or LS spine soft tissue/joints for the purpose of modulating pain, promoting relaxation, increasing ROM, reducing/eliminating soft tissue swelling/inflammation/restriction, improving soft tissue extensibility and allowing for proper ROM for normal function with self-care, mobility, lifting and ambulation. Modalities: n/a    Charges:  Timed Code Treatment Minutes: 61'   Total Treatment Minutes:  60'   BWC:  TE TIME:  NMR TIME:  MANUAL TIME:  UNTIMED MINUTES:   -  -  -  -      [] EVAL (LOW) 99757 (typically 20 minutes face-to-face)  [] EVAL (MOD) 18126 (typically 30 minutes face-to-face)  [] EVAL (HIGH) 42127 (typically 45 minutes face-to-face)  [] RE-EVAL     [x] GL(19976) x   3  [] IONTO  [x] NMR (21298) x  1   [] VASO  [] Manual (72571) x     [] Other:  [] TA x      [] Mech Traction (70783)  [] ES(attended) (22859)      [] ES (un) (34370):    ASSESSMENT:  See eval    GOALS:   Short Term Goals: To be achieved in: 2 weeks  1. Independent in HEP and progression per patient tolerance, in order to prevent re-injury. [x] Progressing: [] Met: [] Not Met: [] Adjusted   2. Patient will have a decrease in pain to facilitate improvement in movement, function, and ADLs as indicated by Functional Deficits.   [x] Progressing: [] Met: [] Not Met: [] Adjusted     Long Term Goals: To be achieved in: 12 weeks  1. Disability index score of 20% or less for the LEFS to assist with reaching prior level of function. [x] Progressing: [] Met: [] Not Met: [] Adjusted   2. Patient will demonstrate increased AROM of R Knee from 0 -120 °  To all for ease with squatting when lifting items at work to allow for proper joint functioning as indicated by patients Functional Deficits. [x] Progressing: [] Met: [] Not Met: [] Adjusted   3. Patient will demonstrate an increase in strength of R Knee/Hip to 5/5 to be able to squat, lift, and stand for at least 8 hours at work. [x] Progressing: [] Met: [] Not Met: [] Adjusted   4. Patient will return demonstrate improved SLS on LE to >/ 10 seconds to decreased fall risk or risk injury during the work day when having to shift weight to one side when reaching for items. [x] Progressing: [] Met: [] Not Met: [] Adjusted  5. Patient will ambulate without AD with proper heel to toe sequencing gait pattern   [x] Progressing: [] Met: [] Not Met: [] Adjusted  6. Patient will have 0/10 pain with ADL's. [x] Progressing: [] Met: [] Not Met: [] Adjusted   7. Patient stated goal: To return to PLOF and walk without pain. [x] Progressing: [] Met: [] Not Met: [] Adjusted     Overall Progression Towards Functional goals/ Treatment Progress Update:  [x] Patient is progressing as expected towards functional goals listed. [] Progression is slowed due to complexities/Impairments listed. [] Progression has been slowed due to co-morbidities.   [] Plan just implemented, too soon to assess goals progression <30days   [] Goals require adjustment due to lack of progress  [] Patient is not progressing as expected and requires additional follow up with physician  [] Other    Prognosis for POC: [x] Good [] Fair  [] Poor    Patient requires continued skilled intervention: [x] Yes  [] No    Treatment/Activity Tolerance:  [x] Patient able to complete treatment  [] Patient limited by fatigue  [] Patient limited by pain    [] Patient limited by other medical complications  [] Other:     Return to Play: (if applicable)   []  Stage 1: Intro to Strength   []  Stage 2: Return to Run and Strength   []  Stage 3: Return to Jump and Strength   []  Stage 4: Dynamic Strength and Agility   []  Stage 5: Sport Specific Training     []  Ready to Return to Play, Meets All Above Stages   []  Not Ready for Return to Sports   Comments:                         PLAN: See eval  [x] Continue per plan of care [] Alter current plan (see comments above)  [] Plan of care initiated [] Hold pending MD visit [] Discharge    Electronically signed by:  Jen Whitt PTA    Note: If patient does not return for scheduled/ recommended follow up visits, this note will serve as a discharge from care along with most recent update on progress.

## 2020-10-06 ENCOUNTER — OFFICE VISIT (OUTPATIENT)
Dept: ORTHOPEDIC SURGERY | Age: 50
End: 2020-10-06

## 2020-10-06 VITALS — WEIGHT: 225 LBS | HEIGHT: 63 IN | BODY MASS INDEX: 39.87 KG/M2

## 2020-10-06 PROBLEM — Z98.890 S/P RIGHT KNEE ARTHROSCOPY: Status: ACTIVE | Noted: 2020-10-06

## 2020-10-06 PROCEDURE — 99024 POSTOP FOLLOW-UP VISIT: CPT | Performed by: ORTHOPAEDIC SURGERY

## 2020-10-06 NOTE — LETTER
[address]  [address] [facility] [address]  Phone: [phone]  Fax: [phone]    Shaylee Shrestha DO        October 6, 2020     Patient: Andrew Epley   YOB: 1970   Date of Visit: 10/6/2020       To Whom It May Concern: It is my medical opinion that Leatha Alston may return to light duty immediately with the following restrictions: May return on 10/12/2020 working 8 hours a day for two weeks. Patient can return to 10 hour days on 10/26/2020. .    If you have any questions or concerns, please don't hesitate to call.     Sincerely,        Shaylee Shrestha DO  Sports Medicine & Arthroscopic [address] [facility] partner of [facility], [address] [place] [address]

## 2020-10-06 NOTE — PROGRESS NOTES
Chief Complaint  Post-Op Check (ck r knee )    Post op right knee arthroscopy right knee partial medial meniscectomy and chondroplasty  DOS: August 24, 2020       History of Present Illness:  Nolan Sahu is a 53 y.o. female  Here for 6 week follow up of right knee arthroscopy  partial medial meniscectomy and chondroplasty. She is doing well, has stop with physical therapy. Has progressed back to her daily activities. Is ready to return to work but concerned about working the 10-hour days. PHYSICAL EXAMINATION:    Ht 5' 3\" (1.6 m)   Wt 225 lb (102.1 kg)   BMI 39.86 kg/m²     Pain Assessment:       Knee Examination:  Patient is awake, alert, and in no acute distress. Incisions well-healed, mild effusion, mild tenderness on the medial joint line with a negative Lakesha's. Tolerates range of motion 0 to 125 degrees. 5-5 flexion extension strength. Ambulates with a normal gait. Distal neurovascular exam is intact. None  Diagnostic Test Findings: No new xrays taken today      Assessment: Progressing well post op from right knee arthroscopy with  partial medial meniscectomy and chondroplasty. Impression:  Encounter Diagnosis   Name Primary?  S/P right knee arthroscopy Yes         Treatment Plan: Doing well, continue with home exercise program.  Can return to work October 20 working 8-hour days for the first 1 weeks, November 26 she will return to work full duty no restrictions. Hattie Wheeler      This dictation was performed with a verbal recognition program (DRAGON) and it was checked for errors. It is possible that there are still dictated errors within this office note. If so, please bring any errors to my attention for an addendum. All efforts were made to ensure that this office note is accurate.

## 2020-10-15 ENCOUNTER — OFFICE VISIT (OUTPATIENT)
Dept: ORTHOPEDIC SURGERY | Age: 50
End: 2020-10-15

## 2020-10-15 VITALS — BODY MASS INDEX: 39.84 KG/M2 | HEIGHT: 63 IN | WEIGHT: 224.87 LBS

## 2020-10-15 PROCEDURE — 99024 POSTOP FOLLOW-UP VISIT: CPT | Performed by: ORTHOPAEDIC SURGERY

## 2020-10-15 NOTE — PROGRESS NOTES
Chief Complaint  Knee Pain (Right knee)    Post op right knee arthroscopy right knee partial medial meniscectomy and chondroplasty  DOS: August 24, 2020       History of Present Illness:  Diana Zhou is a 53 y.o. female  Here for 7 week follow up of right knee arthroscopy  partial medial meniscectomy and chondroplasty. .  Returned her to work working 8-hour days, patient states she has had increased pain and is unable to do a full 8 hours. Denies any new injury. PHYSICAL EXAMINATION:    Ht 5' 2.99\" (1.6 m)   Wt 224 lb 13.9 oz (102 kg)   BMI 39.84 kg/m²     Pain Assessment:  Pain Assessment  Location of Pain: Knee  Location Modifiers: Right  Severity of Pain: 4  Quality of Pain: Sharp, Dull, Aching  Duration of Pain: Persistent  Frequency of Pain: Constant  Aggravating Factors: Stairs, Standing, Squatting, Walking, Kneeling, Straightening, Exercise, Stretching, Bending  Limiting Behavior: Yes  Relieving Factors: Rest  Result of Injury: No  Work-Related Injury: No  Are there other pain locations you wish to document?: No    Knee Examination:  Patient is awake, alert, and in no acute distress. Mild tenderness along the medial joint line, tolerates range of motion 0 to 130 degrees. 5 out of 5 flexion extension. Distal neurovascular exam is intact. Normal gait. None  Diagnostic Test Findings: No new xrays taken today      Assessment: Progressing well post op from right knee arthroscopy with  partial medial meniscectomy and chondroplasty. Impression:  Encounter Diagnosis   Name Primary?  S/P right knee arthroscopy Yes         Treatment Plan: We will return her to work with 6-hour days for 1 month, follow-up in 1 month to further discussed restrictionsAyo Oliveros      This dictation was performed with a verbal recognition program (DRAGON) and it was checked for errors. It is possible that there are still dictated errors within this office note.  If so, please bring any errors to my attention for an addendum. All efforts were made to ensure that this office note is accurate.

## 2020-10-15 NOTE — LETTER
[facility]  [facility] [address]  Phone: [phone]  Fax: [phone],         October 15, 2020    [address] [address]      To Whom It May Concern. Patient needs to work 6 hour days for 5 days a week for one month, she will see physician on 11/16/2020 for further evaluation of right knee s/p right knee arthroscopy. If you have any questions or concerns, please don't hesitate to call.     Sincerely,        Tisha Church DO  Sports Medicine & Arthroscopic [address] [facility] partner of [facility], [address] [facility]

## 2020-10-15 NOTE — LETTER
[facility]  [facility] [address]  Phone: [phone]  Fax: [phone]    Ara Franco DO        October 15, 2020    [address] [place] [address]      To Whom It May Concern    Patient needs to work 6 hour work days for one month, she will see physician on 11/16/2020 for further evaluation of right knee s/p right knee arthroscopy. If you have any questions or concerns, please don't hesitate to call.     Sincerely,        Ara Franco DO  Sports Medicine & Arthroscopic [address] [facility] partner of [place], [place]

## 2020-11-16 ENCOUNTER — OFFICE VISIT (OUTPATIENT)
Dept: ORTHOPEDIC SURGERY | Age: 50
End: 2020-11-16

## 2020-11-16 VITALS — HEIGHT: 62 IN | WEIGHT: 224 LBS | BODY MASS INDEX: 41.22 KG/M2 | RESPIRATION RATE: 15 BRPM

## 2020-11-16 PROCEDURE — 99024 POSTOP FOLLOW-UP VISIT: CPT | Performed by: ORTHOPAEDIC SURGERY

## 2020-11-16 NOTE — LETTER
[address] [place] [address]  [facility] [address] [address]  Phone: [phone]  Fax: [phone]    Mp Rodriguez DO        November 16, 2020     Patient: Yamileth Randolph   YOB: 1970   Date of Visit: 11/16/2020       To Whom It May Concern: It is my medical opinion that Te Yap should work 8 hours shifts for the next four weeks and then no restrictions after four weeks. If you have any questions or concerns, please don't hesitate to call.     Sincerely,      Mp Rodriguez DO

## 2020-11-16 NOTE — LETTER
[address] [place] [address]  [facility] [address] [address]  Phone: [phone]  Fax: [phone]    Yaron Cunningham DO        November 16, 2020     Patient: Gilma Elder   YOB: 1970   Date of Visit: 11/16/2020       To Whom It May Concern: It is my medical opinion that Kelly Patel 70 8HR SHIFTS/5 DAYS A WEEK FOR THE NEXT 4 WEEKS. NO RESTRICTIONS AFTER 4 WEEKS. If you have any questions or concerns, please don't hesitate to call.     Sincerely,        Yaron Cunningham, [address] [place] [address],

## 2020-11-16 NOTE — PROGRESS NOTES
all of their questions were answered best of our ability and to their satisfaction. Adelaida Renner      This dictation was performed with a verbal recognition program (DRAGON) and it was checked for errors. It is possible that there are still dictated errors within this office note. If so, please bring any errors to my attention for an addendum. All efforts were made to ensure that this office note is accurate.

## 2020-12-08 ENCOUNTER — OFFICE VISIT (OUTPATIENT)
Dept: ORTHOPEDIC SURGERY | Age: 50
End: 2020-12-08
Payer: COMMERCIAL

## 2020-12-08 VITALS — HEIGHT: 62 IN | BODY MASS INDEX: 41.22 KG/M2 | WEIGHT: 224 LBS

## 2020-12-08 PROCEDURE — 99213 OFFICE O/P EST LOW 20 MIN: CPT | Performed by: ORTHOPAEDIC SURGERY

## 2020-12-08 PROCEDURE — G8484 FLU IMMUNIZE NO ADMIN: HCPCS | Performed by: ORTHOPAEDIC SURGERY

## 2020-12-08 PROCEDURE — G8417 CALC BMI ABV UP PARAM F/U: HCPCS | Performed by: ORTHOPAEDIC SURGERY

## 2020-12-08 PROCEDURE — G8428 CUR MEDS NOT DOCUMENT: HCPCS | Performed by: ORTHOPAEDIC SURGERY

## 2020-12-08 PROCEDURE — 1036F TOBACCO NON-USER: CPT | Performed by: ORTHOPAEDIC SURGERY

## 2020-12-08 NOTE — PROGRESS NOTES
I am evaluating this patient as a consult at the request of No referring provider defined for this encounter. Chief Complaint:  Follow-up (OPNP LEFT KNEE)      History of Present Illness:  Diana Zhou is a 53 y.o. female here regarding left knee pain, she has had increasing medial sided left knee pain over the last 15 years, injury occurred with a twisting mechanism. She underwent video arthroscopy on the right knee in 2020 and as her right knee is feeling better she is noticing increased sharp left sided pain. Symptoms improve with rest and anti-inflammatories, worse with twisting and walking. 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 Pain Assessment:       Medical History:  No past medical history on file.   Past Surgical History:   Procedure Laterality Date     SECTION      CHOLECYSTECTOMY      EYE SURGERY      KNEE ARTHROSCOPY Right 2020    RIGHT KNEE VIDEO ARTHROSCOPY, PARTIAL MEDIAL MENISCECTOMY, CHONDROPLASTY performed by Chuck Oliveros DO at [address] [facility] History     Socioeconomic History    Marital status:      Spouse name: Not on file    Number of children: Not on file    Years of education: Not on file    Highest education level: Not on file   Occupational History    Not on file   Social Needs    Financial resource strain: Not on file    Food insecurity     Worry: Not on file     Inability: Not on file    Transportation needs     Medical: Not on file     Non-medical: Not on file   Tobacco Use    Smoking status: Never Smoker    Smokeless tobacco: Never Used   Substance and Sexual Activity    Alcohol use: Never     Frequency: Never    Drug use: Never    Sexual activity: Not on file   Lifestyle    Physical activity     Days per week: Not on file     Minutes per session: Not on file    Stress: Not on file   Relationships    Social connections     Talks on phone: Not on file     Gets together: Not on file     Attends Orthodox service: Not on file     Active member of club or organization: Not on file     Attends meetings of clubs or organizations: Not on file     Relationship status: Not on file    Intimate partner violence     Fear of current or ex partner: Not on file     Emotionally abused: Not on file     Physically abused: Not on file     Forced sexual activity: Not on file   Other Topics Concern    Not on file   Social History Narrative    Not on file     Allergies   Allergen Reactions    Pcn [Penicillins] Rash    Percocet [Oxycodone-Acetaminophen] Rash       Review of Systems:  Constitutional: negative  Respiratory: negative  Cardiovascular: negative  Musculoskeletal:negative except for Follow-up (OPNP LEFT KNEE)    Relevant review of systems reviewed and available in the patient's chart in media tab    Vital Signs:  Vitals:    12/08/20 1350   Weight: 224 lb (101.6 kg)   Height: 5' 2.01\" (1.575 m)         General Exam:   Constitutional: Patient is adequately groomed with no evidence of malnutrition  Mental Status: The patient is oriented to time, place and person. The patient's mood and affect are appropriate. Vascular: Examination reveals no swelling or calf tenderness. Peripheral pulses are palpable and 2+. left Knee Examination  Inspection:   No gross deformities noted. mild swelling noted. No erythema or ecchymosis. Skin is intact. Palpation: positive Tenderness to palpation along the medial joint line, negative Tenderness to palpation along lateral joint line, negative Tenderness to palpation along medial and lateral facets of undersurface of the patella    Range of Motion:  0-125    Strength:  Able to do SLR    Special Tests:  ACL, MCL, PCL, LCL are intact with stress exam.  There negative crepitus noted with range of motion under the patella. A negative grind test.  positive Lakesha's and Apley compression test.       Skin: There are no rashes, ulcerations or lesions.     Gait: Normal gait, no assistive devices    Reflex: not tested    Additional Examinations:  Right Lower Extremity: Examination of the right lower extremity does not show any tenderness, deformity or injury. Range of motion is unremarkable. There is no gross instability. There are no rashes, ulcerations or lesions. Strength and tone are normal.      LUMBAR SPINE: The skin is warm and dry. There is no swelling, warmth, or erythema. Range of motion is within normal limits. There is no paraspinal or spinous process tenderness. Ipsilateral and contralateral straight leg raising tests are negative. The distal neurovascular exam is grossly intact and symmetric. X-RAYS: 2 views weightbearing AP, lateral. PA and sunrise of the left knee were obtained and reviewed, they show no periosteal reaction, medullary lesions, or osteopenia. She has mild tricompartmental arthritic changes, marginal osteophytes noted in the patellofemoral compartment. Incidentally noted is progressive joint space narrowing in the medial compartment of the right knee since her last x-rays in July 2020. No evidence of fracture or dislocation. Assessment : Left knee pain concerning for medial meniscus tear    Impression:  Encounter Diagnosis   Name Primary?  Left knee pain, unspecified chronicity Yes       Office Procedures:  Orders Placed This Encounter   Procedures    XR KNEE LEFT (MIN 4 VIEWS)     Order Specific Question:   Reason for exam:     Answer:   LEFT KNEE PAIN     No orders of the defined types were placed in this encounter. Treatment Plan: Based on patient's history and physical exam I'm concerned about medial meniscus tear therefore I would like to obtain an MRI to further evaluate. Once the MRI is obtained the patient will follow up with me for further evaluation and discussion. Patient agrees with this plan, all of their questions were answered best of our ability and to their satisfaction.           Robert Mackay

## 2020-12-11 ENCOUNTER — TELEPHONE (OUTPATIENT)
Dept: ORTHOPEDIC SURGERY | Age: 50
End: 2020-12-11

## 2020-12-11 NOTE — TELEPHONE ENCOUNTER
Called & spoke with the patient informing her that since her MRI is scheduled we can go ahead and schedule her follow up appointment. Patient was scheduled to go over MRI results on 12/15/2020 at [facility] at 1:30pm with Doctor Jovan Villarreal.

## 2020-12-15 ENCOUNTER — OFFICE VISIT (OUTPATIENT)
Dept: ORTHOPEDIC SURGERY | Age: 50
End: 2020-12-15
Payer: COMMERCIAL

## 2020-12-15 VITALS — BODY MASS INDEX: 41.22 KG/M2 | HEIGHT: 62 IN | WEIGHT: 224 LBS

## 2020-12-15 PROBLEM — M17.12 PRIMARY OSTEOARTHRITIS OF LEFT KNEE: Status: ACTIVE | Noted: 2020-12-15

## 2020-12-15 PROBLEM — S83.242A ACUTE MEDIAL MENISCUS TEAR OF LEFT KNEE: Status: ACTIVE | Noted: 2020-12-15

## 2020-12-15 PROCEDURE — G8417 CALC BMI ABV UP PARAM F/U: HCPCS | Performed by: ORTHOPAEDIC SURGERY

## 2020-12-15 PROCEDURE — G8427 DOCREV CUR MEDS BY ELIG CLIN: HCPCS | Performed by: ORTHOPAEDIC SURGERY

## 2020-12-15 PROCEDURE — 99214 OFFICE O/P EST MOD 30 MIN: CPT | Performed by: ORTHOPAEDIC SURGERY

## 2020-12-15 PROCEDURE — G8484 FLU IMMUNIZE NO ADMIN: HCPCS | Performed by: ORTHOPAEDIC SURGERY

## 2020-12-15 PROCEDURE — 1036F TOBACCO NON-USER: CPT | Performed by: ORTHOPAEDIC SURGERY

## 2020-12-15 NOTE — PROGRESS NOTES
 Not on file     Current Outpatient Medications   Medication Sig Dispense Refill    ibuprofen (ADVIL;MOTRIN) 600 MG tablet Take 1 tablet by mouth every 8 hours as needed for Pain 30 tablet 0    Cholecalciferol (VITAMIN D) 50 MCG (2000 UT) CAPS capsule Take by mouth       No current facility-administered medications for this visit. Allergies   Allergen Reactions    Pcn [Penicillins] Rash    Percocet [Oxycodone-Acetaminophen] Rash       Review of Systems:  Constitutional: negative  Respiratory: negative  Cardiovascular: negative  Musculoskeletal:negative except for Follow-up (TR MRI LEFT KNEE)    Relevant review of systems reviewed and available in the patient's chart in media tab    General Exam:   Constitutional: Patient is adequately groomed with no evidence of malnutrition  Mental Status: The patient is oriented to time, place and person. The patient's mood and affect are appropriate. Vascular: Examination reveals no swelling or calf tenderness. Peripheral pulses are palpable and 2+. OBJECTIVE:  Vital Signs:  Vitals:    12/15/20 1323   Weight: 224 lb (101.6 kg)   Height: 5' 2.01\" (1.575 m)       Appearance: alert, well appearing, and in no distress, oriented to person, place, and time and overweight. Physical exam:   Continued sharp pain medially with a positive Lakesha's. 4+ out of 5 strength with flexion extension. Distal vascular exam is intact. Ambulates with a normal gait. Skin clean dry and intact. X-RAYS: 2 views weightbearing AP, lateral. PA and sunrise of the left knee were reviewed, they show no periosteal reaction, medullary lesions, or osteopenia. She has mild tricompartmental arthritic changes, marginal osteophytes noted in the patellofemoral compartment. Incidentally noted is progressive joint space narrowing in the medial compartment of the right knee since her last x-rays in July 2020. No evidence of fracture or dislocation. MRI images of the left knee were reviewed and show:  1. Grade 3-4 chondromalacia of medial femorotibial compartment with severe chondral thinning and fissuring. 1.5cm complex radial flap tear of posterior root and horn of medial meniscus. 2. Grade 3 chondromalacia of lateral femorotibial compartment and patellofemoral articulation with chondral thinning and fissuring. 3. Moderate notch synovitis secondary to stenosis. Assessment : Left knee medial meniscus tear, chondromalacia    Impression:  Encounter Diagnoses   Name Primary?  Acute medial meniscus tear of left knee, initial encounter     Primary osteoarthritis of left knee        Office Procedures:  No orders of the defined types were placed in this encounter. No orders of the defined types were placed in this encounter. Treatment Plan: I had a lengthy discussion with the patient about the pathophysiology of left knee degenerative medial meniscus tear, chondromalacia and their treatment options. The 1st option would be to pursue no further treatment and continue living with their knee pain and dysfunction. The 2nd treatment option would be to pursue conservative treatment, including physical therapy, injections and oral medications.  The 3rd option would be to pursue surgical intervention including left knee video arthroscopy partial medial meniscectomy and chondroplasty Risks and benefits of each option were discussed in great detail with the patient, at this time the patient would like to pursue surgical intervention. I spent 25+ minutes face to face with the patient including 13+ minutes discussing and answering questions regarding the risks, benefits, and complications of left knee video arthroscopy partial medial meniscectomy and chondroplasty in detail. We talked about the risks of surgery which include but are not limited to infection, bleeding, nerve injury resulting in motor or sensory loss, DVT, RSD, persistent pain, loss of motion, functional instability, and need for further surgery. At no time were any guarantees implied or stated. The patient acknowledged these risks and electively reviewed and signed the consent form. Surgery will be scheduled for a mutually convenient time. I will perform the H&P at time of surgery.     Migel Michelle

## 2020-12-16 ENCOUNTER — TELEPHONE (OUTPATIENT)
Dept: ORTHOPEDIC SURGERY | Age: 50
End: 2020-12-16

## 2020-12-16 NOTE — TELEPHONE ENCOUNTER
RECEIVED [facility] PAPERWORK FOR COMPLETION. WILL COMPLETE ONCE SX INFO IS IN Epic.

## 2020-12-21 ENCOUNTER — TELEPHONE (OUTPATIENT)
Dept: ORTHOPEDIC SURGERY | Age: 50
End: 2020-12-21

## 2020-12-29 ENCOUNTER — HOSPITAL ENCOUNTER (OUTPATIENT)
Dept: PHYSICAL THERAPY | Age: 50
Setting detail: THERAPIES SERIES
Discharge: HOME | End: 2020-12-29
Payer: COMMERCIAL

## 2020-12-29 ENCOUNTER — OFFICE VISIT (OUTPATIENT)
Dept: ORTHOPEDIC SURGERY | Age: 50
End: 2020-12-29

## 2020-12-29 VITALS — HEIGHT: 64 IN | WEIGHT: 224 LBS | BODY MASS INDEX: 38.24 KG/M2

## 2020-12-29 PROBLEM — Z98.890 S/P LEFT KNEE ARTHROSCOPY: Status: ACTIVE | Noted: 2020-12-29

## 2020-12-29 PROCEDURE — 99024 POSTOP FOLLOW-UP VISIT: CPT | Performed by: ORTHOPAEDIC SURGERY

## 2020-12-29 PROCEDURE — 97164 PT RE-EVAL EST PLAN CARE: CPT | Performed by: PHYSICAL THERAPIST

## 2020-12-29 PROCEDURE — 97140 MANUAL THERAPY 1/> REGIONS: CPT | Performed by: PHYSICAL THERAPIST

## 2020-12-29 PROCEDURE — 97110 THERAPEUTIC EXERCISES: CPT | Performed by: PHYSICAL THERAPIST

## 2020-12-29 NOTE — FLOWSHEET NOTE
[facility], [address] [place] [address] Carlos Escamilla, [id number]    Physical Therapy Treatment Note/ Progress Report:     Date:  2020    Patient Name:  Jose Francisco Esparza    :  1970  MRN: [id number]  Restrictions/Precautions:    Medical/Treatment Diagnosis Information:  · Diagnosis:   S/p L Partial Medial Meniscectomy 2020  S/p R Partial Medial Meniscectomy  2020  · Treatment Diagnosis: M25.562 / X95.981G  Insurance/Certification information:  PT Insurance Information: [facility]  Physician Information:  Referring Practitioner: Manish Neville  Has the plan of care been signed (Y/N):        []  Yes  [x]  No     Date of Patient follow up with Physician:  10/6/2020    Is this a Progress Report:     []  Yes  [x]  No      If Yes:  Date Range for reporting period:  Initial Eval: 2020  Beginnin2020 --- Ending: 10/1/2020  Beginnin2020 --- Endin2021    Progress report will be due (10 Rx or 30 days whichever is less):     Recertification will be due (POC Duration  / 90 days whichever is less): 3/29/2021      Visit # Insurance Allowable Auth Required   In Person 10 30 visits ( if more then 30 visits needed Med Review required) []  Yes     []  No    Tele Health -  []  Yes     []  No    Total 10       Functional Scale: LEFS: 70% (Score: 24/80)   Date assessed: 2020  Functional Scale: LEFS: 90% (Score: 8/80)   Date assessed: 2020       Latex Allergy:  [x]NO      []YES  Preferred Language for Healthcare:   [x]English       []other:    Pain level: 4-6/10     SUBJECTIVE:  Post op left knee arthroscopy partial medial meniscectomy and chondroplasty on 20.     OBJECTIVE: See eval  ? Observation:  · Test measurements:  AAROM Knee flexion  75 ° 2020     RESTRICTIONS/PRECAUTIONS: Partial Med Meniscectomy with Chondroplasty     Exercises/Interventions:   Therapeutic Ex (94129)  Therapeutic Activity (37782)  NMR re-education (75360) Sets/Reps Notes/CUES   Bike Quad Sets 15 x 5\"    Heel Slides 15 x 5\"    SLR 3 x 10    SSLR 3 x 10    HL Hip Add 20 x 5\"          Heel Prop 5 mins    SL Clamshell 20 x 5\"    Bridging  20 x 5\"         LAQ/SAQ 30 x  2#        Long Sit Gastroc Stretch 3 x 30\"    Long Sit HS stretch 3 x 30\"         Slant Board 3 x 30\"                                            Gait Training 5'                                  Manual Intervention (18071)     Patella Mobs 5'    PROM Knee Flex/Ext 5'                                            Patient Education 5'  Pt ed with HEP and progression of PT tx       Therapeutic Exercise and NMR EXR  [x] (49612) Provided verbal/tactile cueing for activities related to strengthening, flexibility, endurance, ROM for improvements in LE, proximal hip, and core control with self care, mobility, lifting, ambulation. [x] (10629) Provided verbal/tactile cueing for activities related to improving balance, coordination, kinesthetic sense, posture, motor skill, proprioception to assist with LE, proximal hip, and core control in self-care, mobility, lifting, ambulation and eccentric single leg control. NMR and Therapeutic Activities:    [x] (27675 or 96629) Provided verbal/tactile cueing for activities related to improving balance, coordination, kinesthetic sense, posture, motor skill, proprioception and motor activation to allow for proper function of core, proximal hip and LE with self-care and ADLs and functional mobility.    [x] (03269) Gait Re-education- Provided training and instruction to the patient for proper LE, core and proximal hip recruitment and positioning and eccentric body weight control with ambulation re-education including up and down stairs     Home Exercise Program:    [x] (71485) Reviewed/Progressed HEP activities related to strengthening, flexibility, endurance, ROM of core, proximal hip and LE for functional self-care, mobility, lifting and ambulation/stair navigation [x] (38175) Reviewed/Progressed HEP activities related to improving balance, coordination, kinesthetic sense, posture, motor skill, proprioception of core, proximal hip and LE for self-care, mobility, lifting, and ambulation/stair navigation      Manual Treatments:  PROM / STM / Oscillations-Mobs:  G-I, II, III, IV (PA's, Inf., Post.)  [x] (17623) Provided manual therapy to mobilize LE, proximal hip and/or LS spine soft tissue/joints for the purpose of modulating pain, promoting relaxation, increasing ROM, reducing/eliminating soft tissue swelling/inflammation/restriction, improving soft tissue extensibility and allowing for proper ROM for normal function with self-care, mobility, lifting and ambulation. Modalities: n/a    Charges:  Timed Code Treatment Minutes: 40   Total Treatment Minutes:  60   BWC:  TE TIME:  NMR TIME:  MANUAL TIME:  UNTIMED MINUTES:   -  -  -  -      [] EVAL (LOW) 07436 (typically 20 minutes face-to-face)  [] EVAL (MOD) 17482 (typically 30 minutes face-to-face)  [] EVAL (HIGH) 03163 (typically 45 minutes face-to-face)  [x] RE-EVAL     [x] LD(33479) x  2  [] IONTO  [] NMR (63256) x   [] VASO  [x] Manual (19838) x 1    [] Other:  [] TA x      [] Mech Traction (59809)  [] ES(attended) (05601)      [] ES (un) (04063):    ASSESSMENT:  See eval    GOALS:   Short Term Goals: To be achieved in: 2 weeks  1. Independent in HEP and progression per patient tolerance, in order to prevent re-injury. [x] Progressing: [] Met: [] Not Met: [] Adjusted   2. Patient will have a decrease in pain to facilitate improvement in movement, function, and ADLs as indicated by Functional Deficits. [x] Progressing: [] Met: [] Not Met: [] Adjusted     Long Term Goals: To be achieved in: 12 weeks  1. Disability index score of 20% or less for the LEFS to assist with reaching prior level of function.    [x] Progressing: [] Met: [] Not Met: [] Adjusted 2. Patient will demonstrate increased AROM of R Knee from 0 -120 °  To all for ease with squatting when lifting items at work to allow for proper joint functioning as indicated by patients Functional Deficits. [x] Progressing: [] Met: [] Not Met: [] Adjusted   3. Patient will demonstrate an increase in strength of R Knee/Hip to 5/5 to be able to squat, lift, and stand for at least 8 hours at work. [x] Progressing: [] Met: [] Not Met: [] Adjusted   4. Patient will return demonstrate improved SLS on LE to >/ 10 seconds to decreased fall risk or risk injury during the work day when having to shift weight to one side when reaching for items. [x] Progressing: [] Met: [] Not Met: [] Adjusted  5. Patient will ambulate without AD with proper heel to toe sequencing gait pattern   [x] Progressing: [] Met: [] Not Met: [] Adjusted  6. Patient will have 0/10 pain with ADL's. [x] Progressing: [] Met: [] Not Met: [] Adjusted   7. Patient stated goal: Return to work with no restrictions. [x] Progressing: [] Met: [] Not Met: [] Adjusted     Overall Progression Towards Functional goals/ Treatment Progress Update:  [x] Patient is progressing as expected towards functional goals listed. [] Progression is slowed due to complexities/Impairments listed. [] Progression has been slowed due to co-morbidities.   [] Plan just implemented, too soon to assess goals progression <30days   [] Goals require adjustment due to lack of progress  [] Patient is not progressing as expected and requires additional follow up with physician  [] Other    Prognosis for POC: [x] Good [] Fair  [] Poor    Patient requires continued skilled intervention: [x] Yes  [] No    Treatment/Activity Tolerance:  [x] Patient able to complete treatment  [] Patient limited by fatigue  [] Patient limited by pain    [] Patient limited by other medical complications  [] Other:     Return to Play: (if applicable)   []  Stage 1: Intro to Strength

## 2020-12-29 NOTE — PROGRESS NOTES
Chief Complaint  Post-Op Check (PO LEFT KNEE SX 12/21/2020; DOING WELL )    Post op left knee arthroscopy partial medial meniscectomy and chondroplasty  DOS: 12/21/20       History of Present Illness:  Denver Francis is a 53 y.o. female  Here for first follow up of left knee arthroscopy partial medial meniscectomy and chondroplasty. Progressing well. Has not started outpatient physical therapy. The patient's pain is rated at 5/10. The patient denies fever, wound drainage, increasing redness, pus, increasing swelling. Taking oral pain medication as needed. Using local cold therapy as needed. PHYSICAL EXAMINATION:    Ht 5' 3.5\" (1.613 m)   Wt 224 lb (101.6 kg)   BMI 39.06 kg/m²     Pain Assessment:  Pain Assessment  Location of Pain: Knee  Location Modifiers: Left  Severity of Pain: 5  Quality of Pain: Aching  Duration of Pain: Persistent  Frequency of Pain: Constant  Aggravating Factors: Bending, Standing, Walking, Stairs  Limiting Behavior: Some  Relieving Factors: Rest, Ice    Knee Examination:  Patient is awake, alert, and in no acute distress. Dressing removed today. Portals are healing well. Skin is intact. Minimal ecchymosis. Sensation is intact to light touch throughout lower extremity. The wound is clean, dry, no drainage. There is no warmth, erythema, or purulent drainage over the incision. Patient tolerated gentle passive range of motion. ROM has been progressing. Intra-operative findings were discussed. None  Diagnostic Test Findings: No new xrays taken today      Assessment: Progressing well post op from left knee arthroscopy with partial medial meniscectomy and chondroplasty. Impression:  Encounter Diagnosis   Name Primary?  S/P left knee arthroscopy Yes         Treatment Plan:  Sutures were removed and steri-strips applied. Specific precautions were reviewed and emphasized. Patient will start outpatient physical therapy. Follow up appointment was arranged at patient's convenience in 4 weeks. Elena Marmolejo      This dictation was performed with a verbal recognition program (DRAGON) and it was checked for errors. It is possible that there are still dictated errors within this office note. If so, please bring any errors to my attention for an addendum. All efforts were made to ensure that this office note is accurate.

## 2020-12-30 ENCOUNTER — HOSPITAL ENCOUNTER (OUTPATIENT)
Dept: PHYSICAL THERAPY | Age: 50
Setting detail: THERAPIES SERIES
End: 2020-12-30
Payer: COMMERCIAL

## 2021-01-06 ENCOUNTER — HOSPITAL ENCOUNTER (OUTPATIENT)
Dept: PHYSICAL THERAPY | Age: 51
Setting detail: THERAPIES SERIES
Discharge: HOME | End: 2021-01-06
Payer: COMMERCIAL

## 2021-01-06 PROCEDURE — 97140 MANUAL THERAPY 1/> REGIONS: CPT | Performed by: PHYSICAL THERAPY ASSISTANT

## 2021-01-06 PROCEDURE — 97112 NEUROMUSCULAR REEDUCATION: CPT | Performed by: PHYSICAL THERAPY ASSISTANT

## 2021-01-06 PROCEDURE — 97110 THERAPEUTIC EXERCISES: CPT | Performed by: PHYSICAL THERAPY ASSISTANT

## 2021-01-06 NOTE — FLOWSHEET NOTE
[facility], [address] [place] [address] Parminder Escamilla [address], [id number]    Physical Therapy Treatment Note/ Progress Report:     Date:  2021    Patient Name:  Lamont Liao    :  1970  MRN: [id number]  Restrictions/Precautions:    Medical/Treatment Diagnosis Information:  · Diagnosis:   S/p L Partial Medial Meniscectomy 2020  S/p R Partial Medial Meniscectomy  2020  · Treatment Diagnosis: M25.562 / A46.593X  Insurance/Certification information:  PT Insurance Information: [facility]  Physician Information:  Referring Practitioner: Alex Massey  Has the plan of care been signed (Y/N):        []  Yes  [x]  No     Date of Patient follow up with Physician:  10/6/2020    Is this a Progress Report:     []  Yes  [x]  No      If Yes:  Date Range for reporting period:  Initial Eval: 2020  Beginnin2020 --- Ending: 10/1/2020  Beginnin2020 --- Endin2021    Progress report will be due (10 Rx or 30 days whichever is less):     Recertification will be due (POC Duration  / 90 days whichever is less): 3/29/2021      Visit # Insurance Allowable Auth Required   In Person   10 2020 30 visits ( if more then 30 visits needed Med Review required) []  Yes     []  No    Tele Health -  []  Yes     []  No    Total 11       Functional Scale: LEFS: 70% (Score: 24/80)   Date assessed: 2020  Functional Scale: LEFS: 90% (Score: 8/80)   Date assessed: 2020       Latex Allergy:  [x]NO      []YES  Preferred Language for Healthcare:   [x]English       []other:    Pain level: 4-6/10     SUBJECTIVE:  States that she is not getting any relief from the right knee secondary to the arthritis in the knee and the cold. The left knee however is feeling pretty good.   OBJECTIVE: See eval  ? Observation:  · Test measurements:  AAROM Knee flexion  75 ° 2020     RESTRICTIONS/PRECAUTIONS: Partial Med Meniscectomy with Chondroplasty     Exercises/Interventions:   Therapeutic Ex (79856) [x] (30080) Reviewed/Progressed HEP activities related to strengthening, flexibility, endurance, ROM of core, proximal hip and LE for functional self-care, mobility, lifting and ambulation/stair navigation   [x] (84770) Reviewed/Progressed HEP activities related to improving balance, coordination, kinesthetic sense, posture, motor skill, proprioception of core, proximal hip and LE for self-care, mobility, lifting, and ambulation/stair navigation      Manual Treatments:  PROM / STM / Oscillations-Mobs:  G-I, II, III, IV (PA's, Inf., Post.)  [x] (97183) Provided manual therapy to mobilize LE, proximal hip and/or LS spine soft tissue/joints for the purpose of modulating pain, promoting relaxation, increasing ROM, reducing/eliminating soft tissue swelling/inflammation/restriction, improving soft tissue extensibility and allowing for proper ROM for normal function with self-care, mobility, lifting and ambulation. Modalities: n/a    Charges:  Timed Code Treatment Minutes: 55   Total Treatment Minutes:  55   BWC:  TE TIME:  NMR TIME:  MANUAL TIME:  UNTIMED MINUTES:   -  -  -  -      [] EVAL (LOW) 79586 (typically 20 minutes face-to-face)  [] EVAL (MOD) 60124 (typically 30 minutes face-to-face)  [] EVAL (HIGH) 45134 (typically 45 minutes face-to-face)  [] RE-EVAL     [x] RY(88251) x  2  [] IONTO  [x] NMR (14324) x 1  [] VASO  [x] Manual (34451) x 1    [] Other:  [] TA x      [] Mech Traction (84442)  [] ES(attended) (93347)      [] ES (un) (93135):    ASSESSMENT:  See eval    GOALS:   Short Term Goals: To be achieved in: 2 weeks  1. Independent in HEP and progression per patient tolerance, in order to prevent re-injury. [x] Progressing: [] Met: [] Not Met: [] Adjusted   2. Patient will have a decrease in pain to facilitate improvement in movement, function, and ADLs as indicated by Functional Deficits. [x] Progressing: [] Met: [] Not Met: [] Adjusted     Long Term Goals:  To be achieved in: 12 weeks 1. Disability index score of 20% or less for the LEFS to assist with reaching prior level of function. [x] Progressing: [] Met: [] Not Met: [] Adjusted   2. Patient will demonstrate increased AROM of R Knee from 0 -120 °  To all for ease with squatting when lifting items at work to allow for proper joint functioning as indicated by patients Functional Deficits. [x] Progressing: [] Met: [] Not Met: [] Adjusted   3. Patient will demonstrate an increase in strength of R Knee/Hip to 5/5 to be able to squat, lift, and stand for at least 8 hours at work. [x] Progressing: [] Met: [] Not Met: [] Adjusted   4. Patient will return demonstrate improved SLS on LE to >/ 10 seconds to decreased fall risk or risk injury during the work day when having to shift weight to one side when reaching for items. [x] Progressing: [] Met: [] Not Met: [] Adjusted  5. Patient will ambulate without AD with proper heel to toe sequencing gait pattern   [x] Progressing: [] Met: [] Not Met: [] Adjusted  6. Patient will have 0/10 pain with ADL's. [x] Progressing: [] Met: [] Not Met: [] Adjusted   7. Patient stated goal: Return to work with no restrictions. [x] Progressing: [] Met: [] Not Met: [] Adjusted     Overall Progression Towards Functional goals/ Treatment Progress Update:  [x] Patient is progressing as expected towards functional goals listed. [] Progression is slowed due to complexities/Impairments listed. [] Progression has been slowed due to co-morbidities.   [] Plan just implemented, too soon to assess goals progression <30days   [] Goals require adjustment due to lack of progress  [] Patient is not progressing as expected and requires additional follow up with physician  [] Other    Prognosis for POC: [x] Good [] Fair  [] Poor    Patient requires continued skilled intervention: [x] Yes  [] No    Treatment/Activity Tolerance:  [x] Patient able to complete treatment  [] Patient limited by fatigue

## 2021-01-08 ENCOUNTER — HOSPITAL ENCOUNTER (OUTPATIENT)
Dept: PHYSICAL THERAPY | Age: 51
Setting detail: THERAPIES SERIES
Discharge: HOME | End: 2021-01-08
Payer: COMMERCIAL

## 2021-01-08 PROCEDURE — 97140 MANUAL THERAPY 1/> REGIONS: CPT | Performed by: PHYSICAL THERAPY ASSISTANT

## 2021-01-08 PROCEDURE — 97112 NEUROMUSCULAR REEDUCATION: CPT | Performed by: PHYSICAL THERAPY ASSISTANT

## 2021-01-08 PROCEDURE — 97110 THERAPEUTIC EXERCISES: CPT | Performed by: PHYSICAL THERAPY ASSISTANT

## 2021-01-08 NOTE — FLOWSHEET NOTE
[facility], [address] [place] [address] Carlos Escamilla, [address]    Physical Therapy Treatment Note/ Progress Report:     Date:  2021    Patient Name:  Meryl Silva    :  1970  MRN: [id number]  Restrictions/Precautions:    Medical/Treatment Diagnosis Information:  · Diagnosis:   S/p L Partial Medial Meniscectomy 2020  S/p R Partial Medial Meniscectomy  2020  · Treatment Diagnosis: M25.562 / U64.515J  Insurance/Certification information:  PT Insurance Information: [facility]  Physician Information:  Referring Practitioner: Tomy Giles  Has the plan of care been signed (Y/N):        []  Yes  [x]  No     Date of Patient follow up with Physician:  10/6/2020    Is this a Progress Report:     []  Yes  [x]  No      If Yes:  Date Range for reporting period:  Initial Eval: 2020  Beginnin2020 --- Ending: 10/1/2020  Beginnin2020 --- Endin2021    Progress report will be due (10 Rx or 30 days whichever is less):     Recertification will be due (POC Duration  / 90 days whichever is less): 3/29/2021      Visit # Insurance Allowable Auth Required   In Person   10 2020 30 visits ( if more then 30 visits needed Med Review required) []  Yes     []  No    Tele Health -  []  Yes     []  No    Total 11       Functional Scale: LEFS: 70% (Score: 24/80)   Date assessed: 2020  Functional Scale: LEFS: 90% (Score: 8/80)   Date assessed: 2020       Latex Allergy:  [x]NO      []YES  Preferred Language for Healthcare:   [x]English       []other:    Pain level: 6/10     SUBJECTIVE:  Muscle soreness after last visit with addition of new exercises. A little knee soreness but this settled with ice.       OBJECTIVE: See eval  ? Observation:  · Test measurements:  ROM: 0-115 2020    RESTRICTIONS/PRECAUTIONS: Partial Med Meniscectomy with Chondroplasty     Exercises/Interventions:   Therapeutic Ex (04066)  Therapeutic Activity (92896) NMR re-education (50546) Sets/Reps Notes/CUES   Bike 5'         HR/ SLant Board  X30 / 3x30\"    [place]     HS Curl / Hip ABD  2x10 R, L ea         FSU  6\" x20     Leg Press  80# x30                    Quad Sets 15 x 5\"    Heel Slides 15 x 5\"    SLR 3 x 10    SSLR 3 x 10              Heel Prop 5 mins    SL Clamshell 20 x 5\" green   Bridging  20 x 5\" green        LAQ/SAQ 30 x  2#        Long Sit Gastroc Stretch 3 x 30\"    Long Sit HS stretch 3 x 30\"         Slant Board 3 x 30\"                                            Gait Training 5'                                  Manual Intervention (41989)     Patella Mobs 5'    PROM Knee Flex/Ext 5'                                            Patient Education 5'  Pt ed with HEP and progression of PT tx       Therapeutic Exercise and NMR EXR  [x] (55762) Provided verbal/tactile cueing for activities related to strengthening, flexibility, endurance, ROM for improvements in LE, proximal hip, and core control with self care, mobility, lifting, ambulation. [x] (77029) Provided verbal/tactile cueing for activities related to improving balance, coordination, kinesthetic sense, posture, motor skill, proprioception to assist with LE, proximal hip, and core control in self-care, mobility, lifting, ambulation and eccentric single leg control. NMR and Therapeutic Activities:    [x] (36712 or 93423) Provided verbal/tactile cueing for activities related to improving balance, coordination, kinesthetic sense, posture, motor skill, proprioception and motor activation to allow for proper function of core, proximal hip and LE with self-care and ADLs and functional mobility.    [x] (88862) Gait Re-education- Provided training and instruction to the patient for proper LE, core and proximal hip recruitment and positioning and eccentric body weight control with ambulation re-education including up and down stairs     Home Exercise Program: [x] (41924) Reviewed/Progressed HEP activities related to strengthening, flexibility, endurance, ROM of core, proximal hip and LE for functional self-care, mobility, lifting and ambulation/stair navigation   [x] (18571) Reviewed/Progressed HEP activities related to improving balance, coordination, kinesthetic sense, posture, motor skill, proprioception of core, proximal hip and LE for self-care, mobility, lifting, and ambulation/stair navigation      Manual Treatments:  PROM / STM / Oscillations-Mobs:  G-I, II, III, IV (PA's, Inf., Post.)  [x] (25835) Provided manual therapy to mobilize LE, proximal hip and/or LS spine soft tissue/joints for the purpose of modulating pain, promoting relaxation, increasing ROM, reducing/eliminating soft tissue swelling/inflammation/restriction, improving soft tissue extensibility and allowing for proper ROM for normal function with self-care, mobility, lifting and ambulation. Modalities: n/a    Charges:  Timed Code Treatment Minutes: 55   Total Treatment Minutes:  55   BWC:  TE TIME:  NMR TIME:  MANUAL TIME:  UNTIMED MINUTES:   -  -  -  -      [] EVAL (LOW) 89449 (typically 20 minutes face-to-face)  [] EVAL (MOD) 12139 (typically 30 minutes face-to-face)  [] EVAL (HIGH) 41889 (typically 45 minutes face-to-face)  [] RE-EVAL     [x] LY(43140) x  2  [] IONTO  [x] NMR (18050) x 1  [] VASO  [x] Manual (12046) x 1    [] Other:  [] TA x      [] Mech Traction (75492)  [] ES(attended) (62579)      [] ES (un) (08290):    ASSESSMENT:  See eval    GOALS:   Short Term Goals: To be achieved in: 2 weeks  1. Independent in HEP and progression per patient tolerance, in order to prevent re-injury. [x] Progressing: [] Met: [] Not Met: [] Adjusted   2. Patient will have a decrease in pain to facilitate improvement in movement, function, and ADLs as indicated by Functional Deficits. [x] Progressing: [] Met: [] Not Met: [] Adjusted     Long Term Goals:  To be achieved in: 12 weeks 1. Disability index score of 20% or less for the LEFS to assist with reaching prior level of function. [x] Progressing: [] Met: [] Not Met: [] Adjusted   2. Patient will demonstrate increased AROM of R Knee from 0 -120 °  To all for ease with squatting when lifting items at work to allow for proper joint functioning as indicated by patients Functional Deficits. [x] Progressing: [] Met: [] Not Met: [] Adjusted   3. Patient will demonstrate an increase in strength of R Knee/Hip to 5/5 to be able to squat, lift, and stand for at least 8 hours at work. [x] Progressing: [] Met: [] Not Met: [] Adjusted   4. Patient will return demonstrate improved SLS on LE to >/ 10 seconds to decreased fall risk or risk injury during the work day when having to shift weight to one side when reaching for items. [x] Progressing: [] Met: [] Not Met: [] Adjusted  5. Patient will ambulate without AD with proper heel to toe sequencing gait pattern   [x] Progressing: [] Met: [] Not Met: [] Adjusted  6. Patient will have 0/10 pain with ADL's. [x] Progressing: [] Met: [] Not Met: [] Adjusted   7. Patient stated goal: Return to work with no restrictions. [x] Progressing: [] Met: [] Not Met: [] Adjusted     Overall Progression Towards Functional goals/ Treatment Progress Update:  [x] Patient is progressing as expected towards functional goals listed. [] Progression is slowed due to complexities/Impairments listed. [] Progression has been slowed due to co-morbidities.   [] Plan just implemented, too soon to assess goals progression <30days   [] Goals require adjustment due to lack of progress  [] Patient is not progressing as expected and requires additional follow up with physician  [] Other    Prognosis for POC: [x] Good [] Fair  [] Poor    Patient requires continued skilled intervention: [x] Yes  [] No    Treatment/Activity Tolerance:  [x] Patient able to complete treatment  [] Patient limited by fatigue [] Patient limited by pain    [] Patient limited by other medical complications  [] Other:     Return to Play: (if applicable)   []  Stage 1: Intro to Strength   []  Stage 2: Return to Run and Strength   []  Stage 3: Return to Jump and Strength   []  Stage 4: Dynamic Strength and Agility   []  Stage 5: Sport Specific Training     []  Ready to Return to Play, Meets All Above Stages   []  Not Ready for Return to Sports   Comments:                         PLAN: 2x week/ 10 weeks  [x] Continue per plan of care [] Alter current plan (see comments above)  [] Plan of care initiated [] Hold pending MD visit [] Discharge    Electronically signed by:  Odette Norris PTA    Note: If patient does not return for scheduled/ recommended follow up visits, this note will serve as a discharge from care along with most recent update on progress.

## 2021-01-13 ENCOUNTER — HOSPITAL ENCOUNTER (OUTPATIENT)
Dept: PHYSICAL THERAPY | Age: 51
Setting detail: THERAPIES SERIES
Discharge: HOME | End: 2021-01-13
Payer: COMMERCIAL

## 2021-01-13 ENCOUNTER — TELEPHONE (OUTPATIENT)
Dept: ORTHOPEDIC SURGERY | Age: 51
End: 2021-01-13

## 2021-01-13 PROCEDURE — 97140 MANUAL THERAPY 1/> REGIONS: CPT | Performed by: PHYSICAL THERAPY ASSISTANT

## 2021-01-13 PROCEDURE — 97112 NEUROMUSCULAR REEDUCATION: CPT | Performed by: PHYSICAL THERAPY ASSISTANT

## 2021-01-13 PROCEDURE — 97110 THERAPEUTIC EXERCISES: CPT | Performed by: PHYSICAL THERAPY ASSISTANT

## 2021-01-13 NOTE — FLOWSHEET NOTE
[facility], [address] [place] [address] Micah Escamillaus, [address]    Physical Therapy Treatment Note/ Progress Report:     Date:  2021    Patient Name:  Kiara Jack    :  1970  MRN: [id number]  Restrictions/Precautions:    Medical/Treatment Diagnosis Information:  · Diagnosis:   S/p L Partial Medial Meniscectomy 2020  S/p R Partial Medial Meniscectomy  2020  · Treatment Diagnosis: M25.562 / T68.941M  Insurance/Certification information:  PT Insurance Information: [facility]  Physician Information:  Referring Practitioner: Ashwini Alexander  Has the plan of care been signed (Y/N):        []  Yes  [x]  No     Date of Patient follow up with Physician:  10/6/2020    Is this a Progress Report:     []  Yes  [x]  No      If Yes:  Date Range for reporting period:  Initial Eval: 2020  Beginnin2020 --- Ending: 10/1/2020  Beginnin2020 --- Endin2021    Progress report will be due (10 Rx or 30 days whichever is less):     Recertification will be due (POC Duration  / 90 days whichever is less): 3/29/2021      Visit # Insurance Allowable Auth Required   In Person 3-2021  10 = 2020 30 visits ( if more then 30 visits needed Med Review required) []  Yes     []  No    Tele Health -  []  Yes     []  No    Total 13       Functional Scale: LEFS: 70% (Score: 24/80)   Date assessed: 2020  Functional Scale: LEFS: 90% (Score: 8/80)   Date assessed: 2020       Latex Allergy:  [x]NO      []YES  Preferred Language for Healthcare:   [x]English       []other:    Pain level: 6/10     SUBJECTIVE: Doing well - a little sore after last visit but relates this more to the cold than the therapy.      OBJECTIVE: See eval   Observation:  · Test measurements:  ROM: 0-115 2020    RESTRICTIONS/PRECAUTIONS: Partial Med Meniscectomy with Chondroplasty     Exercises/Interventions:   Therapeutic Ex (92618)  Therapeutic Activity (98936)  NMR re-education (61460) Sets/Reps Notes/CUES   Bike 5' HR/ SLant Board  X30 / 3x30\"    [place]  2# X20     HS Curl   2# 2x15 R, L ea         FSU  6\" x20     Leg Press  80# x30 / 60# x20    ABBY TKE / ABD  60# x20 / 45# x30 R, L               Quad Sets 15 x 5\"    Heel Slides 15 x 5\"    SLR 3 x 10    SSLR 3 x 10              Heel Prop 5 mins    SL Clamshell 20 x 5\" green   Bridging  20 x 5\" green        LAQ/SAQ 30 x  2#        Long Sit Gastroc Stretch 3 x 30\"    Long Sit HS stretch 3 x 30\"         Slant Board 3 x 30\"                                            Gait Training 5'                                  Manual Intervention (91133)     Patella Mobs 5'    PROM Knee Flex/Ext 5'                                            Patient Education 5'  Pt ed with HEP and progression of PT tx       Therapeutic Exercise and NMR EXR  [x] (78816) Provided verbal/tactile cueing for activities related to strengthening, flexibility, endurance, ROM for improvements in LE, proximal hip, and core control with self care, mobility, lifting, ambulation. [x] (76298) Provided verbal/tactile cueing for activities related to improving balance, coordination, kinesthetic sense, posture, motor skill, proprioception to assist with LE, proximal hip, and core control in self-care, mobility, lifting, ambulation and eccentric single leg control. NMR and Therapeutic Activities:    [x] (75193 or 57908) Provided verbal/tactile cueing for activities related to improving balance, coordination, kinesthetic sense, posture, motor skill, proprioception and motor activation to allow for proper function of core, proximal hip and LE with self-care and ADLs and functional mobility.    [x] (40329) Gait Re-education- Provided training and instruction to the patient for proper LE, core and proximal hip recruitment and positioning and eccentric body weight control with ambulation re-education including up and down stairs     Home Exercise Program:    [x] (59825) Reviewed/Progressed HEP activities related to strengthening, flexibility, endurance, ROM of core, proximal hip and LE for functional self-care, mobility, lifting and ambulation/stair navigation   [x] (58027) Reviewed/Progressed HEP activities related to improving balance, coordination, kinesthetic sense, posture, motor skill, proprioception of core, proximal hip and LE for self-care, mobility, lifting, and ambulation/stair navigation      Manual Treatments:  PROM / STM / Oscillations-Mobs:  G-I, II, III, IV (PA's, Inf., Post.)  [x] (72047) Provided manual therapy to mobilize LE, proximal hip and/or LS spine soft tissue/joints for the purpose of modulating pain, promoting relaxation, increasing ROM, reducing/eliminating soft tissue swelling/inflammation/restriction, improving soft tissue extensibility and allowing for proper ROM for normal function with self-care, mobility, lifting and ambulation. Modalities: n/a    Charges:  Timed Code Treatment Minutes: 50   Total Treatment Minutes:  50   BWC:  TE TIME:  NMR TIME:  MANUAL TIME:  UNTIMED MINUTES:   -  -  -  -      [] EVAL (LOW) 22306 (typically 20 minutes face-to-face)  [] EVAL (MOD) 10238 (typically 30 minutes face-to-face)  [] EVAL (HIGH) 05884 (typically 45 minutes face-to-face)  [] RE-EVAL     [x] UR(72532) x  2  [] IONTO  [x] NMR (57442) x 1  [] VASO  [x] Manual (60493) x 1    [] Other:  [] TA x      [] Mech Traction (80849)  [] ES(attended) (58450)      [] ES (un) (13337):    ASSESSMENT:  See eval    GOALS:   Short Term Goals: To be achieved in: 2 weeks  1. Independent in HEP and progression per patient tolerance, in order to prevent re-injury. [x] Progressing: [] Met: [] Not Met: [] Adjusted   2. Patient will have a decrease in pain to facilitate improvement in movement, function, and ADLs as indicated by Functional Deficits. [x] Progressing: [] Met: [] Not Met: [] Adjusted     Long Term Goals: To be achieved in: 12 weeks  1.  Disability index score of 20% or less for the LEFS to assist with reaching prior level of function. [x] Progressing: [] Met: [] Not Met: [] Adjusted   2. Patient will demonstrate increased AROM of R Knee from 0 -120 °  To all for ease with squatting when lifting items at work to allow for proper joint functioning as indicated by patients Functional Deficits. [x] Progressing: [] Met: [] Not Met: [] Adjusted   3. Patient will demonstrate an increase in strength of R Knee/Hip to 5/5 to be able to squat, lift, and stand for at least 8 hours at work. [x] Progressing: [] Met: [] Not Met: [] Adjusted   4. Patient will return demonstrate improved SLS on LE to >/ 10 seconds to decreased fall risk or risk injury during the work day when having to shift weight to one side when reaching for items. [x] Progressing: [] Met: [] Not Met: [] Adjusted  5. Patient will ambulate without AD with proper heel to toe sequencing gait pattern   [x] Progressing: [] Met: [] Not Met: [] Adjusted  6. Patient will have 0/10 pain with ADL's. [x] Progressing: [] Met: [] Not Met: [] Adjusted   7. Patient stated goal: Return to work with no restrictions. [x] Progressing: [] Met: [] Not Met: [] Adjusted     Overall Progression Towards Functional goals/ Treatment Progress Update:  [x] Patient is progressing as expected towards functional goals listed. [] Progression is slowed due to complexities/Impairments listed. [] Progression has been slowed due to co-morbidities.   [] Plan just implemented, too soon to assess goals progression <30days   [] Goals require adjustment due to lack of progress  [] Patient is not progressing as expected and requires additional follow up with physician  [] Other    Prognosis for POC: [x] Good [] Fair  [] Poor    Patient requires continued skilled intervention: [x] Yes  [] No    Treatment/Activity Tolerance:  [x] Patient able to complete treatment  [] Patient limited by fatigue  [] Patient limited by pain    [] Patient limited by other medical complications  [] Other: Return to Play: (if applicable)   []  Stage 1: Intro to Strength   []  Stage 2: Return to Run and Strength   []  Stage 3: Return to Jump and Strength   []  Stage 4: Dynamic Strength and Agility   []  Stage 5: Sport Specific Training     []  Ready to Return to Play, Meets All Above Stages   []  Not Ready for Return to Sports   Comments:                         PLAN: 2x week/ 10 weeks  [x] Continue per plan of care [] Alter current plan (see comments above)  [] Plan of care initiated [] Hold pending MD visit [] Discharge    Electronically signed by:  Samm Peralta PTA    Note: If patient does not return for scheduled/ recommended follow up visits, this note will serve as a discharge from care along with most recent update on progress.

## 2021-01-15 ENCOUNTER — HOSPITAL ENCOUNTER (OUTPATIENT)
Dept: PHYSICAL THERAPY | Age: 51
Setting detail: THERAPIES SERIES
Discharge: HOME | End: 2021-01-15
Payer: COMMERCIAL

## 2021-01-15 PROCEDURE — 97110 THERAPEUTIC EXERCISES: CPT | Performed by: PHYSICAL THERAPIST

## 2021-01-15 PROCEDURE — 97140 MANUAL THERAPY 1/> REGIONS: CPT | Performed by: PHYSICAL THERAPIST

## 2021-01-15 NOTE — FLOWSHEET NOTE
[facility], [address] [place] [address] Carlos Escamilla, [address]    Physical Therapy Treatment Note/ Progress Report:     Date:  1/15/2021    Patient Name:  Karen Sandra    :  1970  MRN: [id number]  Restrictions/Precautions:    Medical/Treatment Diagnosis Information:  · Diagnosis:   S/p L Partial Medial Meniscectomy 2020  S/p R Partial Medial Meniscectomy  2020  · Treatment Diagnosis: M25.562 / N72.522G  Insurance/Certification information:  PT Insurance Information: [facility]  Physician Information:  Referring Practitioner: Seema Villareal  Has the plan of care been signed (Y/N):        []  Yes  [x]  No     Date of Patient follow up with Physician:  21    Is this a Progress Report:     []  Yes  [x]  No      If Yes:  Date Range for reporting period:  Initial Eval: 2020  Beginnin2020 --- Ending: 10/1/2020  Beginnin2020 --- Endin2021    Progress report will be due (10 Rx or 30 days whichever is less):     Recertification will be due (POC Duration  / 90 days whichever is less): 3/29/2021      Visit # Insurance Allowable Auth Required   In Person   10 2020 30 visits ( if more then 30 visits needed Med Review required) []  Yes     []  No    Tele Health -  []  Yes     []  No    Total 14       Functional Scale: LEFS: 70% (Score: 24/80)   Date assessed: 2020  Functional Scale: LEFS: 90% (Score: 8/80)   Date assessed: 2020       Latex Allergy:  [x]NO      []YES  Preferred Language for Healthcare:   [x]English       []other:    Pain level: 6/10     SUBJECTIVE: Today she reports her entire body is hurting (back all the way down to her feet). She says her arthritis is acting up today.      OBJECTIVE: See eval  ? Observation:  · Test measurements:  ROM: 0-115 2020    RESTRICTIONS/PRECAUTIONS: Partial Med Meniscectomy with Chondroplasty     Exercises/Interventions:   Therapeutic Ex (33359)  Therapeutic Activity (33577) NMR re-education (68034) Sets/Reps Notes/CUES   Bike 5'         HR/ SLant Board  X30 / 3x30\"    Tatyana Cocking  2# X20     HS Curl   2# 2x15 R, L ea         FSU  6\" x20     Leg Press  80# x30 / 60# x20    ABBY TKE / ABD  60# x20 / 45# x30 R, L               Quad Sets 15 x 5\"    Heel Slides 15 x 5\"    SLR 3 x 10    SSLR 3 x 10              Heel Prop 5 mins    SL Clamshell 20 x 5\" green   Bridging  20 x 5\" green        LAQ/SAQ 30 x  2#        Long Sit Gastroc Stretch 3 x 30\"    Long Sit HS stretch 3 x 30\"         Slant Board 3 x 30\"                                            Gait Training 5'                                  Manual Intervention (43917)     Patella Mobs 5'    PROM Knee Flex/Ext 5'                                            Patient Education 5'  Pt ed with HEP and progression of PT tx       Therapeutic Exercise and NMR EXR  [x] (77779) Provided verbal/tactile cueing for activities related to strengthening, flexibility, endurance, ROM for improvements in LE, proximal hip, and core control with self care, mobility, lifting, ambulation. [x] (16383) Provided verbal/tactile cueing for activities related to improving balance, coordination, kinesthetic sense, posture, motor skill, proprioception to assist with LE, proximal hip, and core control in self-care, mobility, lifting, ambulation and eccentric single leg control. NMR and Therapeutic Activities:    [x] (55379 or 26735) Provided verbal/tactile cueing for activities related to improving balance, coordination, kinesthetic sense, posture, motor skill, proprioception and motor activation to allow for proper function of core, proximal hip and LE with self-care and ADLs and functional mobility.    [x] (19857) Gait Re-education- Provided training and instruction to the patient for proper LE, core and proximal hip recruitment and positioning and eccentric body weight control with ambulation re-education including up and down stairs     Home Exercise Program: [] Patient limited by pain    [] Patient limited by other medical complications  [] Other:     Return to Play: (if applicable)   []  Stage 1: Intro to Strength   []  Stage 2: Return to Run and Strength   []  Stage 3: Return to Jump and Strength   []  Stage 4: Dynamic Strength and Agility   []  Stage 5: Sport Specific Training     []  Ready to Return to Play, Meets All Above Stages   []  Not Ready for Return to Sports   Comments:                         PLAN: 2x week/ 10 weeks  [x] Continue per plan of care [] Alter current plan (see comments above)  [] Plan of care initiated [] Hold pending MD visit [] Discharge    Electronically signed by:  Apurva Dooley PT    Note: If patient does not return for scheduled/ recommended follow up visits, this note will serve as a discharge from care along with most recent update on progress.

## 2021-01-20 ENCOUNTER — HOSPITAL ENCOUNTER (OUTPATIENT)
Dept: PHYSICAL THERAPY | Age: 51
Setting detail: THERAPIES SERIES
Discharge: HOME | End: 2021-01-20
Payer: COMMERCIAL

## 2021-01-20 PROCEDURE — 97110 THERAPEUTIC EXERCISES: CPT | Performed by: PHYSICAL THERAPY ASSISTANT

## 2021-01-20 PROCEDURE — 97112 NEUROMUSCULAR REEDUCATION: CPT | Performed by: PHYSICAL THERAPY ASSISTANT

## 2021-01-20 NOTE — FLOWSHEET NOTE
[x] (61185) Reviewed/Progressed HEP activities related to strengthening, flexibility, endurance, ROM of core, proximal hip and LE for functional self-care, mobility, lifting and ambulation/stair navigation   [x] (28051) Reviewed/Progressed HEP activities related to improving balance, coordination, kinesthetic sense, posture, motor skill, proprioception of core, proximal hip and LE for self-care, mobility, lifting, and ambulation/stair navigation      Manual Treatments:  PROM / STM / Oscillations-Mobs:  G-I, II, III, IV (PA's, Inf., Post.)  [x] (60448) Provided manual therapy to mobilize LE, proximal hip and/or LS spine soft tissue/joints for the purpose of modulating pain, promoting relaxation, increasing ROM, reducing/eliminating soft tissue swelling/inflammation/restriction, improving soft tissue extensibility and allowing for proper ROM for normal function with self-care, mobility, lifting and ambulation. Modalities: n/a    Charges:  Timed Code Treatment Minutes: 50   Total Treatment Minutes:  50   BWC:  TE TIME:  NMR TIME:  MANUAL TIME:  UNTIMED MINUTES:   -  -  -  -      [] EVAL (LOW) 42345 (typically 20 minutes face-to-face)  [] EVAL (MOD) 81949 (typically 30 minutes face-to-face)  [] EVAL (HIGH) 28430 (typically 45 minutes face-to-face)  [] RE-EVAL     [x] VA(82370) x  2  [] IONTO  [x] NMR (83885) x 1  [] VASO  [] Manual (67630)     [] Other:  [] TA x      [] Mech Traction (73659)  [] ES(attended) (76551)      [] ES (un) (88680):    ASSESSMENT:  See eval    GOALS:   Short Term Goals: To be achieved in: 2 weeks  1. Independent in HEP and progression per patient tolerance, in order to prevent re-injury. [x] Progressing: [] Met: [] Not Met: [] Adjusted   2. Patient will have a decrease in pain to facilitate improvement in movement, function, and ADLs as indicated by Functional Deficits. [x] Progressing: [] Met: [] Not Met: [] Adjusted     Long Term Goals:  To be achieved in: 12 weeks 1. Disability index score of 20% or less for the LEFS to assist with reaching prior level of function. [x] Progressing: [] Met: [] Not Met: [] Adjusted   2. Patient will demonstrate increased AROM of R Knee from 0 -120 °  To all for ease with squatting when lifting items at work to allow for proper joint functioning as indicated by patients Functional Deficits. [x] Progressing: [] Met: [] Not Met: [] Adjusted   3. Patient will demonstrate an increase in strength of R Knee/Hip to 5/5 to be able to squat, lift, and stand for at least 8 hours at work. [x] Progressing: [] Met: [] Not Met: [] Adjusted   4. Patient will return demonstrate improved SLS on LE to >/ 10 seconds to decreased fall risk or risk injury during the work day when having to shift weight to one side when reaching for items. [x] Progressing: [] Met: [] Not Met: [] Adjusted  5. Patient will ambulate without AD with proper heel to toe sequencing gait pattern   [x] Progressing: [] Met: [] Not Met: [] Adjusted  6. Patient will have 0/10 pain with ADL's. [x] Progressing: [] Met: [] Not Met: [] Adjusted   7. Patient stated goal: Return to work with no restrictions. [x] Progressing: [] Met: [] Not Met: [] Adjusted     Overall Progression Towards Functional goals/ Treatment Progress Update:  [x] Patient is progressing as expected towards functional goals listed. [] Progression is slowed due to complexities/Impairments listed. [] Progression has been slowed due to co-morbidities.   [] Plan just implemented, too soon to assess goals progression <30days   [] Goals require adjustment due to lack of progress  [] Patient is not progressing as expected and requires additional follow up with physician  [] Other    Prognosis for POC: [x] Good [] Fair  [] Poor    Patient requires continued skilled intervention: [x] Yes  [] No    Treatment/Activity Tolerance:  [x] Patient able to complete treatment  [] Patient limited by fatigue [] Patient limited by pain    [] Patient limited by other medical complications  [] Other:     Return to Play: (if applicable)   []  Stage 1: Intro to Strength   []  Stage 2: Return to Run and Strength   []  Stage 3: Return to Jump and Strength   []  Stage 4: Dynamic Strength and Agility   []  Stage 5: Sport Specific Training     []  Ready to Return to Play, Meets All Above Stages   []  Not Ready for Return to Sports   Comments:                         PLAN: 2x week/ 10 weeks  [x] Continue per plan of care [] Alter current plan (see comments above)  [] Plan of care initiated [] Hold pending MD visit [] Discharge    Electronically signed by:  Ruth Garcia PTA    Note: If patient does not return for scheduled/ recommended follow up visits, this note will serve as a discharge from care along with most recent update on progress.

## 2021-01-22 ENCOUNTER — HOSPITAL ENCOUNTER (OUTPATIENT)
Dept: PHYSICAL THERAPY | Age: 51
Setting detail: THERAPIES SERIES
Discharge: HOME | End: 2021-01-22
Payer: COMMERCIAL

## 2021-01-22 PROCEDURE — 97110 THERAPEUTIC EXERCISES: CPT | Performed by: PHYSICAL THERAPIST

## 2021-01-22 PROCEDURE — 97112 NEUROMUSCULAR REEDUCATION: CPT | Performed by: PHYSICAL THERAPIST

## 2021-01-22 NOTE — FLOWSHEET NOTE
[facility], [address] [place] [address] Carlos Escamilla, [id number]    Physical Therapy Treatment Note/ Progress Report:     Date:  2021    Patient Name:  Jeff Albert    :  1970  MRN: [id number]  Restrictions/Precautions:    Medical/Treatment Diagnosis Information:  · Diagnosis:   S/p L Partial Medial Meniscectomy 2020  S/p R Partial Medial Meniscectomy  2020  · Treatment Diagnosis: M25.562 / Z77.193V  Insurance/Certification information:  PT Insurance Information: Felipe  Physician Information:  Referring Practitioner: Demar Culp  Has the plan of care been signed (Y/N):        []  Yes  [x]  No     Date of Patient follow up with Physician:  21    Is this a Progress Report:     []  Yes  [x]  No      If Yes:  Date Range for reporting period:  Initial Eval: 2020  Beginnin2020 --- Ending: 10/1/2020  Beginnin2020 --- Endin2021    Progress report will be due (10 Rx or 30 days whichever is less):     Recertification will be due (POC Duration  / 90 days whichever is less): 3/29/2021      Visit # Insurance Allowable Auth Required   In Person   10 2020 30 visits ( if more then 30 visits needed Med Review required) []  Yes     []  No    Tele Health -  []  Yes     []  No    Total 16       Functional Scale: LEFS: 70% (Score: 24/80)   Date assessed: 2020  Functional Scale: LEFS: 90% (Score: 8/80)   Date assessed: 2020   Functional Scale: LEFS: 38% (Score: 50/80)   Date assessed: 2021       Latex Allergy:  [x]NO      []YES  Preferred Language for Healthcare:   [x]English       []other:    Pain level: 3/10     SUBJECTIVE: Nicole Blackwood is hoping Dr. Demar Culp will let her perform PT for a couple more weeks as it's really improving her left leg. She reports she's still getting some increased pain in the right knee.      OBJECTIVE: See eval  ? Observation:  · Test measurements:  ROM: 0-122 2021 RESTRICTIONS/PRECAUTIONS: Partial Med Meniscectomy with Chondroplasty     Exercises/Interventions:   Therapeutic Ex (69523)  Therapeutic Activity (53913)  NMR re-education (37364) Sets/Reps Notes/CUES   Bike 5'         HR/ Slant Board  X30 / 3x30\"    Sun Arellano  2# X20     HS Curl   2# 2x15 R, L ea    Hip abd 2# x30    FSU  6\" x20     Leg Press   Leg Press 80# x30 DL  60# x20 SL    ABBY TKE / ABD  60# x20 / 45# x30 R, L     Mini Squat  x20         Quad Sets 15 x 5\"    Heel Slides 15 x 5\"    SLR 3 x 10    SSLR 3 x 10              Heel Prop 5 mins    SL Clamshell 20 x 5\" green   Bridging  20 x 5\" green        LAQ/SAQ 30 x  2#        Long Sit Gastroc Stretch 3 x 30\"    Long Sit HS stretch 3 x 30\"                                                      Gait Training 5'                                  Manual Intervention (82411)     Patella Mobs 5'    PROM Knee Flex/Ext 5'                                            Patient Education 5'  Pt ed with HEP and progression of PT tx       Therapeutic Exercise and NMR EXR  [x] (75941) Provided verbal/tactile cueing for activities related to strengthening, flexibility, endurance, ROM for improvements in LE, proximal hip, and core control with self care, mobility, lifting, ambulation. [x] (85174) Provided verbal/tactile cueing for activities related to improving balance, coordination, kinesthetic sense, posture, motor skill, proprioception to assist with LE, proximal hip, and core control in self-care, mobility, lifting, ambulation and eccentric single leg control. NMR and Therapeutic Activities:    [x] (61207 or 19024) Provided verbal/tactile cueing for activities related to improving balance, coordination, kinesthetic sense, posture, motor skill, proprioception and motor activation to allow for proper function of core, proximal hip and LE with self-care and ADLs and functional mobility. [x] (02720) Gait Re-education- Provided training and instruction to the patient for proper LE, core and proximal hip recruitment and positioning and eccentric body weight control with ambulation re-education including up and down stairs     Home Exercise Program:    [x] (66302) Reviewed/Progressed HEP activities related to strengthening, flexibility, endurance, ROM of core, proximal hip and LE for functional self-care, mobility, lifting and ambulation/stair navigation   [x] (06694) Reviewed/Progressed HEP activities related to improving balance, coordination, kinesthetic sense, posture, motor skill, proprioception of core, proximal hip and LE for self-care, mobility, lifting, and ambulation/stair navigation      Manual Treatments:  PROM / STM / Oscillations-Mobs:  G-I, II, III, IV (PA's, Inf., Post.)  [x] (40482) Provided manual therapy to mobilize LE, proximal hip and/or LS spine soft tissue/joints for the purpose of modulating pain, promoting relaxation, increasing ROM, reducing/eliminating soft tissue swelling/inflammation/restriction, improving soft tissue extensibility and allowing for proper ROM for normal function with self-care, mobility, lifting and ambulation. Modalities: n/a    Charges:  Timed Code Treatment Minutes: 50   Total Treatment Minutes:  50   BWC:  TE TIME:  NMR TIME:  MANUAL TIME:  UNTIMED MINUTES:   -  -  -  -      [] EVAL (LOW) 75591 (typically 20 minutes face-to-face)  [] EVAL (MOD) 87564 (typically 30 minutes face-to-face)  [] EVAL (HIGH) 82341 (typically 45 minutes face-to-face)  [] RE-EVAL     [x] PX(70206) x  2  [] IONTO  [x] NMR (95311) x 1  [] VASO  [] Manual (73054)     [] Other:  [] TA x      [] Mech Traction (74866)  [] ES(attended) (18749)      [] ES (un) (38968):    ASSESSMENT:  See eval    GOALS:   Short Term Goals: To be achieved in: 2 weeks  1. Independent in HEP and progression per patient tolerance, in order to prevent re-injury. [x] Progressing: [] Met: [] Not Met: [] Adjusted   2. Patient will have a decrease in pain to facilitate improvement in movement, function, and ADLs as indicated by Functional Deficits. [x] Progressing: [] Met: [] Not Met: [] Adjusted     Long Term Goals: To be achieved in: 12 weeks  1. Disability index score of 20% or less for the LEFS to assist with reaching prior level of function. [x] Progressing: [] Met: [] Not Met: [] Adjusted   2. Patient will demonstrate increased AROM of R Knee from 0 -120 °  To all for ease with squatting when lifting items at work to allow for proper joint functioning as indicated by patients Functional Deficits. [x] Progressing: [] Met: [] Not Met: [] Adjusted   3. Patient will demonstrate an increase in strength of R Knee/Hip to 5/5 to be able to squat, lift, and stand for at least 8 hours at work. [x] Progressing: [] Met: [] Not Met: [] Adjusted   4. Patient will return demonstrate improved SLS on LE to >/ 10 seconds to decreased fall risk or risk injury during the work day when having to shift weight to one side when reaching for items. [x] Progressing: [] Met: [] Not Met: [] Adjusted  5. Patient will ambulate without AD with proper heel to toe sequencing gait pattern   [x] Progressing: [] Met: [] Not Met: [] Adjusted  6. Patient will have 0/10 pain with ADL's. [x] Progressing: [] Met: [] Not Met: [] Adjusted   7. Patient stated goal: Return to work with no restrictions. [x] Progressing: [] Met: [] Not Met: [] Adjusted     Overall Progression Towards Functional goals/ Treatment Progress Update:  [x] Patient is progressing as expected towards functional goals listed. [] Progression is slowed due to complexities/Impairments listed. [] Progression has been slowed due to co-morbidities.   [] Plan just implemented, too soon to assess goals progression <30days   [] Goals require adjustment due to lack of progress [] Patient is not progressing as expected and requires additional follow up with physician  [] Other    Prognosis for POC: [x] Good [] Fair  [] Poor    Patient requires continued skilled intervention: [x] Yes  [] No    Treatment/Activity Tolerance:  [x] Patient able to complete treatment  [] Patient limited by fatigue  [] Patient limited by pain    [] Patient limited by other medical complications  [] Other:     Return to Play: (if applicable)   []  Stage 1: Intro to Strength   []  Stage 2: Return to Run and Strength   []  Stage 3: Return to Jump and Strength   []  Stage 4: Dynamic Strength and Agility   []  Stage 5: Sport Specific Training     []  Ready to Return to Play, Meets All Above Stages   []  Not Ready for Return to Sports   Comments:                         PLAN: 2x week/ 10 weeks  [x] Continue per plan of care [] Alter current plan (see comments above)  [] Plan of care initiated [] Hold pending MD visit [] Discharge    Electronically signed by:  Sandra Tate PT    Note: If patient does not return for scheduled/ recommended follow up visits, this note will serve as a discharge from care along with most recent update on progress.

## 2021-01-26 ENCOUNTER — OFFICE VISIT (OUTPATIENT)
Dept: ORTHOPEDIC SURGERY | Age: 51
End: 2021-01-26

## 2021-01-26 DIAGNOSIS — Z98.890 S/P LEFT KNEE ARTHROSCOPY: Primary | ICD-10-CM

## 2021-01-26 PROCEDURE — 99024 POSTOP FOLLOW-UP VISIT: CPT | Performed by: ORTHOPAEDIC SURGERY

## 2021-01-26 NOTE — LETTER
[address] [address]  Phone: [phone]  Fax: [phone]    Tanya Chavarria DO        January 26, 2021     Patient: Shay Cameron   YOB: 1970   Date of Visit: 1/26/2021       To Whom It May Concern: It is my medical opinion that Misty Rivas may return to work on 2/1/2021 with the following restrictions: working 6 hours a day for 5 days a week for 2 weeks, then patient can return to work with no restrctions on 2/15/2021  . If you have any questions or concerns, please don't hesitate to call.     Sincerely,        Tanya Chavarria, [facility], DO

## 2021-01-26 NOTE — PROGRESS NOTES
Chief Complaint  Post-Op Check (Post op left knee arthroscopy partial medial meniscectomy and chondroplasty)    Post op left knee arthroscopy partial medial meniscectomy and chondroplasty  DOS: 12/21/20       History of Present Illness:  Lamont Liao is a 53 y.o. female  Here for 5-week follow up of left knee arthroscopy partial medial meniscectomy and chondroplasty. She is doing well, is progressing nicely with physical therapy, has 4 out of 10 occasional discomfort. Is ready to return to work with some restrictions. PHYSICAL EXAMINATION:    There were no vitals taken for this visit. Pain Assessment:       Knee Examination:  Patient is awake, alert, and in no acute distress. Incisions well-healed, no effusion left knee. Tolerates range of motion 0 to 130 degrees. Ambulates with a normal gait. 5 out of 5 flexion extension strength. Distal vascular exam is intact. AMB REFERRAL TO PHYSICAL THERAPY  Diagnostic Test Findings: No new xrays taken today      Assessment: Progressing well post op from left knee arthroscopy with partial medial meniscectomy and chondroplasty. Impression:  Encounter Diagnosis   Name Primary?  S/P left knee arthroscopy Yes         Treatment Plan: Continue with physical therapy, return to work 5 days a week 6-hour shifts on February 1, she will maintain his restrictions for 2 weeks before transitioning back to work without restrictions. Follow-up with me as needed. Nelda Prescott      This dictation was performed with a verbal recognition program (DRAGON) and it was checked for errors. It is possible that there are still dictated errors within this office note. If so, please bring any errors to my attention for an addendum. All efforts were made to ensure that this office note is accurate.

## 2021-01-29 ENCOUNTER — HOSPITAL ENCOUNTER (OUTPATIENT)
Dept: PHYSICAL THERAPY | Age: 51
Setting detail: THERAPIES SERIES
Discharge: HOME | End: 2021-01-29
Payer: COMMERCIAL

## 2021-01-29 PROCEDURE — 97110 THERAPEUTIC EXERCISES: CPT | Performed by: PHYSICAL THERAPY ASSISTANT

## 2021-01-29 PROCEDURE — 97112 NEUROMUSCULAR REEDUCATION: CPT | Performed by: PHYSICAL THERAPY ASSISTANT

## 2021-01-29 PROCEDURE — 97140 MANUAL THERAPY 1/> REGIONS: CPT | Performed by: PHYSICAL THERAPY ASSISTANT

## 2021-01-29 NOTE — FLOWSHEET NOTE
NMR re-education (10901) Sets/Reps Notes/CUES   Bike 5'         HR/ Slant Board  X30 / 3x30\"    Susan Italian  2# X30     HS Curl   2# 2x15 R, L ea    Hip abd 2# x30    FSU  6\" x20     Leg Press   Leg Press 80# x30 DL  60# x20 SL    ABBY TKE / ABD  60# x20 / 45# x30 R, L     Mini Squat  x20         Quad Sets 15 x 5\"    Heel Slides 15 x 5\"    SLR 3 x 10 2#    SSLR 3 x 10  2#            Heel Prop 5 mins    SL Clamshell 20 x 5\" green   Bridging  20 x 5\" green        LAQ/SAQ 30 x  2#        Long Sit Gastroc Stretch 3 x 30\"    Long Sit HS stretch 3 x 30\"                                                      Gait Training 5'                                  Manual Intervention (94894)     Patella Mobs 5'    PROM Knee Flex/Ext 5'                                            Patient Education 5'  Pt ed with HEP and progression of PT tx       Therapeutic Exercise and NMR EXR  [x] (40585) Provided verbal/tactile cueing for activities related to strengthening, flexibility, endurance, ROM for improvements in LE, proximal hip, and core control with self care, mobility, lifting, ambulation. [x] (10461) Provided verbal/tactile cueing for activities related to improving balance, coordination, kinesthetic sense, posture, motor skill, proprioception to assist with LE, proximal hip, and core control in self-care, mobility, lifting, ambulation and eccentric single leg control. NMR and Therapeutic Activities:    [x] (36963 or 13096) Provided verbal/tactile cueing for activities related to improving balance, coordination, kinesthetic sense, posture, motor skill, proprioception and motor activation to allow for proper function of core, proximal hip and LE with self-care and ADLs and functional mobility.    [x] (80731) Gait Re-education- Provided training and instruction to the patient for proper LE, core and proximal hip recruitment and positioning and eccentric body weight control with ambulation re-education including up and down stairs Home Exercise Program:    [x] (18575) Reviewed/Progressed HEP activities related to strengthening, flexibility, endurance, ROM of core, proximal hip and LE for functional self-care, mobility, lifting and ambulation/stair navigation   [x] (25855) Reviewed/Progressed HEP activities related to improving balance, coordination, kinesthetic sense, posture, motor skill, proprioception of core, proximal hip and LE for self-care, mobility, lifting, and ambulation/stair navigation      Manual Treatments:  PROM / STM / Oscillations-Mobs:  G-I, II, III, IV (PA's, Inf., Post.)  [x] (69126) Provided manual therapy to mobilize LE, proximal hip and/or LS spine soft tissue/joints for the purpose of modulating pain, promoting relaxation, increasing ROM, reducing/eliminating soft tissue swelling/inflammation/restriction, improving soft tissue extensibility and allowing for proper ROM for normal function with self-care, mobility, lifting and ambulation. Modalities: n/a    Charges:  Timed Code Treatment Minutes: 65   Total Treatment Minutes:  65   BWC:  TE TIME:  NMR TIME:  MANUAL TIME:  UNTIMED MINUTES:   -  -  -  -      [] EVAL (LOW) 37456 (typically 20 minutes face-to-face)  [] EVAL (MOD) 59601 (typically 30 minutes face-to-face)  [] EVAL (HIGH) 43589 (typically 45 minutes face-to-face)  [] RE-EVAL     [x] WG(72948) x  2  [] IONTO  [x] NMR (73816) x 1  [] VASO  [x] Manual (48399) 1    [] Other:  [] TA x      [] Mech Traction (54735)  [] ES(attended) (36577)      [] ES (un) (23225):    ASSESSMENT:  See eval    GOALS:   Short Term Goals: To be achieved in: 2 weeks  1. Independent in HEP and progression per patient tolerance, in order to prevent re-injury. [x] Progressing: [] Met: [] Not Met: [] Adjusted   2. Patient will have a decrease in pain to facilitate improvement in movement, function, and ADLs as indicated by Functional Deficits.   [x] Progressing: [] Met: [] Not Met: [] Adjusted [x] Patient able to complete treatment  [] Patient limited by fatigue  [] Patient limited by pain    [] Patient limited by other medical complications  [] Other:     Return to Play: (if applicable)   []  Stage 1: Intro to Strength   []  Stage 2: Return to Run and Strength   []  Stage 3: Return to Jump and Strength   []  Stage 4: Dynamic Strength and Agility   []  Stage 5: Sport Specific Training     []  Ready to Return to Play, Meets All Above Stages   []  Not Ready for Return to Sports   Comments:                         PLAN: 2x week/ 10 weeks  [x] Continue per plan of care [] Alter current plan (see comments above)  [] Plan of care initiated [] Hold pending MD visit [] Discharge    Electronically signed by:  Tracy Givens PTA    Note: If patient does not return for scheduled/ recommended follow up visits, this note will serve as a discharge from care along with most recent update on progress.

## 2021-02-03 ENCOUNTER — HOSPITAL ENCOUNTER (OUTPATIENT)
Dept: PHYSICAL THERAPY | Age: 51
Setting detail: THERAPIES SERIES
Discharge: HOME | End: 2021-02-03
Payer: COMMERCIAL

## 2021-02-03 PROCEDURE — 97140 MANUAL THERAPY 1/> REGIONS: CPT | Performed by: PHYSICAL THERAPY ASSISTANT

## 2021-02-03 PROCEDURE — 97112 NEUROMUSCULAR REEDUCATION: CPT | Performed by: PHYSICAL THERAPY ASSISTANT

## 2021-02-03 PROCEDURE — 97110 THERAPEUTIC EXERCISES: CPT | Performed by: PHYSICAL THERAPY ASSISTANT

## 2021-02-03 NOTE — FLOWSHEET NOTE
[facility], [address] [place] [address] Carlos Escamilla, [id number]    Physical Therapy Treatment Note/ Progress Report:     Date:  2/3/2021    Patient Name:  Linda Paula    :  1970  MRN: [id number]  Restrictions/Precautions:    Medical/Treatment Diagnosis Information:  · Diagnosis:   S/p L Partial Medial Meniscectomy 2020  S/p R Partial Medial Meniscectomy  2020  · Treatment Diagnosis: M25.562 / E58.725P  Insurance/Certification information:  PT Insurance Information: [facility]  Physician Information:  Referring Practitioner: Yudelka Root  Has the plan of care been signed (Y/N):        []  Yes  [x]  No     Date of Patient follow up with Physician:  21    Is this a Progress Report:     []  Yes  [x]  No      If Yes:  Date Range for reporting period:  Initial Eval: 2020  Beginnin2020 --- Ending: 10/1/2020  Beginnin2020 --- Endin2021    Progress report will be due (10 Rx or 30 days whichever is less):     Recertification will be due (POC Duration  / 90 days whichever is less): 3/29/2021      Visit # Insurance Allowable Auth Required   In Person   10 2020 30 visits ( if more then 30 visits needed Med Review required) []  Yes     []  No    Tele Health -  []  Yes     []  No    Total 18       Functional Scale: LEFS: 70% (Score: 24/80)   Date assessed: 2020  Functional Scale: LEFS: 90% (Score: 8/80)   Date assessed: 2020   Functional Scale: LEFS: 38% (Score: 50/80)   Date assessed: 2021       Latex Allergy:  [x]NO      []YES  Preferred Language for Healthcare:   [x]English       []other:    Pain level: 5/10     SUBJECTIVE: States she is sore today after returning to work 6 hours a day. Had some swelling on Monday but has been OK since then. On her feet a lot more with work activities. MD VISIT 1/26/2021  Continue with physical therapy, return to work 5 days a week 6-hour shifts on February 1, she will maintain his restrictions for 2 weeks before transitioning back to work without restrictions. Follow-up with me as needed. OBJECTIVE: See eval  ? Observation:  · Test measurements:  ROM: 0-122 1/22/2021    RESTRICTIONS/PRECAUTIONS: Partial Med Meniscectomy with Chondroplasty     Exercises/Interventions:   Therapeutic Ex (98248)  Therapeutic Activity (22842)  NMR re-education (25442) Sets/Reps Notes/CUES   Bike 5'         HR/ Slant Board  X30 / 3x30\"    Satish Valentin  2# X30     HS Curl   2# 2x15 R, L ea    Hip abd 2# x30    FSU  6\" x20     Leg Press   Leg Press 80# x30 DL  60# x20 SL    ABBY TKE / ABD  60# x20 / 45# x30 R, L     Mini Squat  x20         Quad Sets 15 x 5\"    Heel Slides 15 x 5\"    SLR 3 x 10 2#    SSLR 3 x 10  2#            Heel Prop 5 mins    SL Clamshell 20 x 5\" green   Bridging  20 x 5\" green        LAQ/SAQ 30 x  2#        Long Sit Gastroc Stretch 3 x 30\"    Long Sit HS stretch 3 x 30\"                                                      Gait Training 5'                                  Manual Intervention (78087)     Patella Mobs 5'    PROM Knee Flex/Ext 5'                                            Patient Education 5'  Pt ed with HEP and progression of PT tx       Therapeutic Exercise and NMR EXR  [x] (58846) Provided verbal/tactile cueing for activities related to strengthening, flexibility, endurance, ROM for improvements in LE, proximal hip, and core control with self care, mobility, lifting, ambulation. [x] (47985) Provided verbal/tactile cueing for activities related to improving balance, coordination, kinesthetic sense, posture, motor skill, proprioception to assist with LE, proximal hip, and core control in self-care, mobility, lifting, ambulation and eccentric single leg control.      NMR and Therapeutic Activities: [x] (73877 or 32703) Provided verbal/tactile cueing for activities related to improving balance, coordination, kinesthetic sense, posture, motor skill, proprioception and motor activation to allow for proper function of core, proximal hip and LE with self-care and ADLs and functional mobility. [x] (26716) Gait Re-education- Provided training and instruction to the patient for proper LE, core and proximal hip recruitment and positioning and eccentric body weight control with ambulation re-education including up and down stairs     Home Exercise Program:    [x] (82298) Reviewed/Progressed HEP activities related to strengthening, flexibility, endurance, ROM of core, proximal hip and LE for functional self-care, mobility, lifting and ambulation/stair navigation   [x] (83117) Reviewed/Progressed HEP activities related to improving balance, coordination, kinesthetic sense, posture, motor skill, proprioception of core, proximal hip and LE for self-care, mobility, lifting, and ambulation/stair navigation      Manual Treatments:  PROM / STM / Oscillations-Mobs:  G-I, II, III, IV (PA's, Inf., Post.)  [x] (66389) Provided manual therapy to mobilize LE, proximal hip and/or LS spine soft tissue/joints for the purpose of modulating pain, promoting relaxation, increasing ROM, reducing/eliminating soft tissue swelling/inflammation/restriction, improving soft tissue extensibility and allowing for proper ROM for normal function with self-care, mobility, lifting and ambulation.      Modalities: n/a    Charges:  Timed Code Treatment Minutes: 65   Total Treatment Minutes:  65   BWC:  TE TIME:  NMR TIME:  MANUAL TIME:  UNTIMED MINUTES:   -  -  -  -      [] EVAL (LOW) 87383 (typically 20 minutes face-to-face)  [] EVAL (MOD) 89960 (typically 30 minutes face-to-face)  [] EVAL (HIGH) 91389 (typically 45 minutes face-to-face)  [] RE-EVAL     [x] PO(50413) x  2  [] IONTO  [x] NMR (78029) x 1  [] VASO  [x] Manual (27160) 1    [] Other: [] TA x      [] [facility] Traction (59702)  [] ES(attended) (05335)      [] ES (un) (91170):    ASSESSMENT:  See eval    GOALS:   Short Term Goals: To be achieved in: 2 weeks  1. Independent in HEP and progression per patient tolerance, in order to prevent re-injury. [x] Progressing: [] Met: [] Not Met: [] Adjusted   2. Patient will have a decrease in pain to facilitate improvement in movement, function, and ADLs as indicated by Functional Deficits. [x] Progressing: [] Met: [] Not Met: [] Adjusted     Long Term Goals: To be achieved in: 12 weeks  1. Disability index score of 20% or less for the LEFS to assist with reaching prior level of function. [x] Progressing: [] Met: [] Not Met: [] Adjusted   2. Patient will demonstrate increased AROM of R Knee from 0 -120 °  To all for ease with squatting when lifting items at work to allow for proper joint functioning as indicated by patients Functional Deficits. [x] Progressing: [] Met: [] Not Met: [] Adjusted   3. Patient will demonstrate an increase in strength of R Knee/Hip to 5/5 to be able to squat, lift, and stand for at least 8 hours at work. [x] Progressing: [] Met: [] Not Met: [] Adjusted   4. Patient will return demonstrate improved SLS on LE to >/ 10 seconds to decreased fall risk or risk injury during the work day when having to shift weight to one side when reaching for items. [x] Progressing: [] Met: [] Not Met: [] Adjusted  5. Patient will ambulate without AD with proper heel to toe sequencing gait pattern   [x] Progressing: [] Met: [] Not Met: [] Adjusted  6. Patient will have 0/10 pain with ADL's. [x] Progressing: [] Met: [] Not Met: [] Adjusted   7. Patient stated goal: Return to work with no restrictions. [x] Progressing: [] Met: [] Not Met: [] Adjusted     Overall Progression Towards Functional goals/ Treatment Progress Update:  [x] Patient is progressing as expected towards functional goals listed. [] Progression is slowed due to complexities/Impairments listed. [] Progression has been slowed due to co-morbidities. [] Plan just implemented, too soon to assess goals progression <30days   [] Goals require adjustment due to lack of progress  [] Patient is not progressing as expected and requires additional follow up with physician  [] Other    Prognosis for POC: [x] Good [] Fair  [] Poor    Patient requires continued skilled intervention: [x] Yes  [] No    Treatment/Activity Tolerance:  [x] Patient able to complete treatment  [] Patient limited by fatigue  [] Patient limited by pain    [] Patient limited by other medical complications  [] Other:     Return to Play: (if applicable)   []  Stage 1: Intro to Strength   []  Stage 2: Return to Run and Strength   []  Stage 3: Return to Jump and Strength   []  Stage 4: Dynamic Strength and Agility   []  Stage 5: Sport Specific Training     []  Ready to Return to Play, Meets All Above Stages   []  Not Ready for Return to Sports   Comments:                         PLAN: 2x week/ 10 weeks  [x] Continue per plan of care [] Alter current plan (see comments above)  [] Plan of care initiated [] Hold pending MD visit [] Discharge    Electronically signed by:  Vadim Moreno PTA    Note: If patient does not return for scheduled/ recommended follow up visits, this note will serve as a discharge from care along with most recent update on progress.

## 2021-02-05 ENCOUNTER — HOSPITAL ENCOUNTER (OUTPATIENT)
Dept: PHYSICAL THERAPY | Age: 51
Setting detail: THERAPIES SERIES
Discharge: HOME | End: 2021-02-05
Payer: COMMERCIAL

## 2021-02-05 PROCEDURE — 97140 MANUAL THERAPY 1/> REGIONS: CPT | Performed by: PHYSICAL THERAPY ASSISTANT

## 2021-02-05 PROCEDURE — 97112 NEUROMUSCULAR REEDUCATION: CPT | Performed by: PHYSICAL THERAPY ASSISTANT

## 2021-02-05 PROCEDURE — 97110 THERAPEUTIC EXERCISES: CPT | Performed by: PHYSICAL THERAPY ASSISTANT

## 2021-02-05 NOTE — FLOWSHEET NOTE
[facility], [address] [place] [address] Carlos Escamilla, [id number]    Physical Therapy Treatment Note/ Progress Report:     Date:  2021    Patient Name:  Kiara Jack    :  1970  MRN: [id number]  Restrictions/Precautions:    Medical/Treatment Diagnosis Information:  · Diagnosis:   S/p L Partial Medial Meniscectomy 2020  S/p R Partial Medial Meniscectomy  2020  · Treatment Diagnosis: M25.562 / P61.725V  Insurance/Certification information:  PT Insurance Information: [facility]  Physician Information:  Referring Practitioner: Ashwini Alexander  Has the plan of care been signed (Y/N):        []  Yes  [x]  No     Date of Patient follow up with Physician:  21    Is this a Progress Report:     []  Yes  [x]  No      If Yes:  Date Range for reporting period:  Initial Eval: 2020  Beginnin2020 --- Ending: 10/1/2020  Beginnin2020 --- Endin2021    Progress report will be due (10 Rx or 30 days whichever is less): 746    Recertification will be due (POC Duration  / 90 days whichever is less): 3/29/2021      Visit # Insurance Allowable Auth Required   In Person   10 =  30 visits ( if more then 30 visits needed Med Review required) []  Yes     []  No    Tele Health -  []  Yes     []  No    Total 19       Functional Scale: LEFS: 70% (Score: 24/80)   Date assessed: 2020  Functional Scale: LEFS: 90% (Score: 8/80)   Date assessed: 2020   Functional Scale: LEFS: 38% (Score: 50/80)   Date assessed: 2021       Latex Allergy:  [x]NO      []YES  Preferred Language for Healthcare:   [x]English       []other:    Pain level: 5/10     SUBJECTIVE:  States she is very sore. She has been moved to a new position at work and is on her feet all the time.   Discussed use of a pad to stand on at work and also resting on her off days and when she gets home from work - propping, ice, rest.        MD VISIT 2021  Continue with physical therapy, return to work 5 days a week 6-hour shifts on February 1, she will maintain his restrictions for 2 weeks before transitioning back to work without restrictions. Follow-up with me as needed. OBJECTIVE: See eval   Observation:  · Test measurements:  ROM: 0-122 1/22/2021    RESTRICTIONS/PRECAUTIONS: Partial Med Meniscectomy with Chondroplasty     Exercises/Interventions:   Therapeutic Ex (44229)  Therapeutic Activity (11215)  NMR re-education (83039) Sets/Reps Notes/CUES   Bike 5'         HR/ Slant Board  X30 / 3x30\"    Marlee Filbert  2# X30     HS Curl   2# 2x15 R, L ea    Hip abd 2# x30    FSU  6\" x20     Leg Press   Leg Press 80# x30 DL  60# x20 SL    ABBY TKE / ABD  60# x20 / 45# x30 R, L     Mini Squat  x20         Quad Sets 15 x 5\"    Heel Slides 15 x 5\"    SLR 3 x 10 2#    SSLR 3 x 10  2#            Heel Prop 5 mins    SL Clamshell 20 x 5\" green   Bridging  20 x 5\" green        LAQ/SAQ 30 x  2#        Long Sit Gastroc Stretch 3 x 30\"    Long Sit HS stretch 3 x 30\"                                                      Gait Training 5'                                  Manual Intervention (73835)     Patella Mobs 5'    PROM Knee Flex/Ext 5'                                            Patient Education 5'  Pt ed with HEP and progression of PT tx       Therapeutic Exercise and NMR EXR  [x] (33737) Provided verbal/tactile cueing for activities related to strengthening, flexibility, endurance, ROM for improvements in LE, proximal hip, and core control with self care, mobility, lifting, ambulation. [x] (88650) Provided verbal/tactile cueing for activities related to improving balance, coordination, kinesthetic sense, posture, motor skill, proprioception to assist with LE, proximal hip, and core control in self-care, mobility, lifting, ambulation and eccentric single leg control.      NMR and Therapeutic Activities:    [x] (65546 or 19343) Provided verbal/tactile cueing for activities related to improving balance, coordination, kinesthetic sense, posture, motor skill, proprioception and motor activation to allow for proper function of core, proximal hip and LE with self-care and ADLs and functional mobility. [x] (02404) Gait Re-education- Provided training and instruction to the patient for proper LE, core and proximal hip recruitment and positioning and eccentric body weight control with ambulation re-education including up and down stairs     Home Exercise Program:    [x] (89128) Reviewed/Progressed HEP activities related to strengthening, flexibility, endurance, ROM of core, proximal hip and LE for functional self-care, mobility, lifting and ambulation/stair navigation   [x] (57240) Reviewed/Progressed HEP activities related to improving balance, coordination, kinesthetic sense, posture, motor skill, proprioception of core, proximal hip and LE for self-care, mobility, lifting, and ambulation/stair navigation      Manual Treatments:  PROM / STM / Oscillations-Mobs:  G-I, II, III, IV (PA's, Inf., Post.)  [x] (20219) Provided manual therapy to mobilize LE, proximal hip and/or LS spine soft tissue/joints for the purpose of modulating pain, promoting relaxation, increasing ROM, reducing/eliminating soft tissue swelling/inflammation/restriction, improving soft tissue extensibility and allowing for proper ROM for normal function with self-care, mobility, lifting and ambulation.      Modalities: n/a    Charges:  Timed Code Treatment Minutes: 65   Total Treatment Minutes:  65   BWC:  TE TIME:  NMR TIME:  MANUAL TIME:  UNTIMED MINUTES:   -  -  -  -      [] EVAL (LOW) 75846 (typically 20 minutes face-to-face)  [] EVAL (MOD) 18784 (typically 30 minutes face-to-face)  [] EVAL (HIGH) 33901 (typically 45 minutes face-to-face)  [] RE-EVAL     [x] TV(18914) x  2  [] IONTO  [x] NMR (92529) x 1  [] VASO  [x] Manual (60178) 1    [] Other:  [] TA x      [] Mech Traction (60005)  [] ES(attended) (44094)      [] ES (un) (52879):    ASSESSMENT:  See eval    GOALS:   Short Term Goals: To be achieved in: 2 weeks  1. Independent in HEP and progression per patient tolerance, in order to prevent re-injury. [x] Progressing: [] Met: [] Not Met: [] Adjusted   2. Patient will have a decrease in pain to facilitate improvement in movement, function, and ADLs as indicated by Functional Deficits. [x] Progressing: [] Met: [] Not Met: [] Adjusted     Long Term Goals: To be achieved in: 12 weeks  1. Disability index score of 20% or less for the LEFS to assist with reaching prior level of function. [x] Progressing: [] Met: [] Not Met: [] Adjusted   2. Patient will demonstrate increased AROM of R Knee from 0 -120 °  To all for ease with squatting when lifting items at work to allow for proper joint functioning as indicated by patients Functional Deficits. [x] Progressing: [] Met: [] Not Met: [] Adjusted   3. Patient will demonstrate an increase in strength of R Knee/Hip to 5/5 to be able to squat, lift, and stand for at least 8 hours at work. [x] Progressing: [] Met: [] Not Met: [] Adjusted   4. Patient will return demonstrate improved SLS on LE to >/ 10 seconds to decreased fall risk or risk injury during the work day when having to shift weight to one side when reaching for items. [x] Progressing: [] Met: [] Not Met: [] Adjusted  5. Patient will ambulate without AD with proper heel to toe sequencing gait pattern   [x] Progressing: [] Met: [] Not Met: [] Adjusted  6. Patient will have 0/10 pain with ADL's. [x] Progressing: [] Met: [] Not Met: [] Adjusted   7. Patient stated goal: Return to work with no restrictions. [x] Progressing: [] Met: [] Not Met: [] Adjusted     Overall Progression Towards Functional goals/ Treatment Progress Update:  [x] Patient is progressing as expected towards functional goals listed. [] Progression is slowed due to complexities/Impairments listed. [] Progression has been slowed due to co-morbidities.   [] Plan just implemented, too soon to assess goals progression <30days   [] Goals require adjustment due to lack of progress  [] Patient is not progressing as expected and requires additional follow up with physician  [] Other    Prognosis for POC: [x] Good [] Fair  [] Poor    Patient requires continued skilled intervention: [x] Yes  [] No    Treatment/Activity Tolerance:  [x] Patient able to complete treatment  [] Patient limited by fatigue  [] Patient limited by pain    [] Patient limited by other medical complications  [] Other:     Return to Play: (if applicable)   []  Stage 1: Intro to Strength   []  Stage 2: Return to Run and Strength   []  Stage 3: Return to Jump and Strength   []  Stage 4: Dynamic Strength and Agility   []  Stage 5: Sport Specific Training     []  Ready to Return to Play, Meets All Above Stages   []  Not Ready for Return to Sports   Comments:                         PLAN: 2x week/ 10 weeks  [x] Continue per plan of care [] Alter current plan (see comments above)  [] Plan of care initiated [] Hold pending MD visit [] Discharge    Electronically signed by:  Randy Kessler PTA    Note: If patient does not return for scheduled/ recommended follow up visits, this note will serve as a discharge from care along with most recent update on progress.

## 2021-02-10 ENCOUNTER — HOSPITAL ENCOUNTER (OUTPATIENT)
Dept: PHYSICAL THERAPY | Age: 51
Setting detail: THERAPIES SERIES
Discharge: HOME | End: 2021-02-10
Payer: COMMERCIAL

## 2021-02-10 ENCOUNTER — APPOINTMENT (OUTPATIENT)
Dept: PHYSICAL THERAPY | Age: 51
End: 2021-02-10
Payer: COMMERCIAL

## 2021-02-10 PROCEDURE — 97140 MANUAL THERAPY 1/> REGIONS: CPT

## 2021-02-10 PROCEDURE — 97110 THERAPEUTIC EXERCISES: CPT

## 2021-02-10 PROCEDURE — 97112 NEUROMUSCULAR REEDUCATION: CPT

## 2021-02-10 NOTE — FLOWSHEET NOTE
[facility], [address] [place], [place] [address], [facility]    Physical Therapy Treatment Note/ Progress Report:     Date:  2/10/2021    Patient Name:  Brandie Nguyen    :  1970  MRN: [id number]  Restrictions/Precautions:    Medical/Treatment Diagnosis Information:  · Diagnosis:   S/p L Partial Medial Meniscectomy 2020  S/p R Partial Medial Meniscectomy  2020  · Treatment Diagnosis: M25.562 / D81.465W  Insurance/Certification information:  PT Insurance Information: [facility]  Physician Information:  Referring Practitioner: Latha Silva  Has the plan of care been signed (Y/N):        []  Yes  [x]  No     Date of Patient follow up with Physician:  21    Is this a Progress Report:     []  Yes  [x]  No      If Yes:  Date Range for reporting period:  Initial Eval: 2020  Beginnin2020 --- Ending: 10/1/2020  Beginnin2020 --- Endin2021    Progress report will be due (10 Rx or 30 days whichever is less):     Recertification will be due (POC Duration  / 90 days whichever is less): 3/29/2021      Visit # Insurance Allowable Auth Required   In Person   10 2020 30 visits ( if more then 30 visits needed Med Review required) []  Yes     []  No    Tele Health -  []  Yes     []  No    Total 20       Functional Scale: LEFS: 70% (Score: 24/80)   Date assessed: 2020  Functional Scale: LEFS: 90% (Score: 8/80)   Date assessed: 2020   Functional Scale: LEFS: 38% (Score: 50/80)   Date assessed: 2021       Latex Allergy:  [x]NO      []YES  Preferred Language for Healthcare:   [x]English       []other:    Pain level: 4-5/10     SUBJECTIVE:  Patient reports being tired after work     MD VISIT 2021  Continue with physical therapy, return to work 5 days a week 6-hour shifts on , she will maintain his restrictions for 2 weeks before transitioning back to work without restrictions. Follow-up with me as needed.     OBJECTIVE: See eval ? Observation:  · Test measurements:  ROM: 0-122 1/22/2021    RESTRICTIONS/PRECAUTIONS: Partial Med Meniscectomy with Chondroplasty     Exercises/Interventions:   Therapeutic Ex (97337)  Therapeutic Activity (72661)  NMR re-education (97450) Sets/Reps Notes/CUES   Bike 5'         Slant Board 3 x 30\"    HR/TR X30 Airex   Marches  2# X30     HS Curl   2# 2x15 R, L ea       FSU  6\" x20     Leg Press   Leg Press 80# x30 DL  60# x20 SL    ABBY TKE / ABD / EXT 60# x20 / 45# x30 R, L/  45# 20 x R,L     Mini Squat  x20 Airex        Quad Sets 15 x 5\"    Heel Slides 15 x 5\"    SLR 3 x 10 2#    SSLR 3 x 10  2#            Heel Prop 5 mins    SL Clamshell 20 x 5\" green   Bridging  20 x 5\" green        LAQ/SAQ 30 x  2#        Long Sit Gastroc Stretch 3 x 30\"    Long Sit HS stretch 3 x 30\"                                                      Gait Training 5'                                  Manual Intervention (93162)     Patella Mobs 5'    PROM Knee Flex/Ext 5'                                            Patient Education 5'  Pt ed with HEP and progression of PT tx       Therapeutic Exercise and NMR EXR  [x] (34178) Provided verbal/tactile cueing for activities related to strengthening, flexibility, endurance, ROM for improvements in LE, proximal hip, and core control with self care, mobility, lifting, ambulation. [x] (99705) Provided verbal/tactile cueing for activities related to improving balance, coordination, kinesthetic sense, posture, motor skill, proprioception to assist with LE, proximal hip, and core control in self-care, mobility, lifting, ambulation and eccentric single leg control. NMR and Therapeutic Activities:    [x] (53906 or 59930) Provided verbal/tactile cueing for activities related to improving balance, coordination, kinesthetic sense, posture, motor skill, proprioception and motor activation to allow for proper function of core, proximal hip and LE with self-care and ADLs and functional mobility. [x] (57970) Gait Re-education- Provided training and instruction to the patient for proper LE, core and proximal hip recruitment and positioning and eccentric body weight control with ambulation re-education including up and down stairs     Home Exercise Program:    [x] (99572) Reviewed/Progressed HEP activities related to strengthening, flexibility, endurance, ROM of core, proximal hip and LE for functional self-care, mobility, lifting and ambulation/stair navigation   [x] (66797) Reviewed/Progressed HEP activities related to improving balance, coordination, kinesthetic sense, posture, motor skill, proprioception of core, proximal hip and LE for self-care, mobility, lifting, and ambulation/stair navigation      Manual Treatments:  PROM / STM / Oscillations-Mobs:  G-I, II, III, IV (PA's, Inf., Post.)  [x] (44121) Provided manual therapy to mobilize LE, proximal hip and/or LS spine soft tissue/joints for the purpose of modulating pain, promoting relaxation, increasing ROM, reducing/eliminating soft tissue swelling/inflammation/restriction, improving soft tissue extensibility and allowing for proper ROM for normal function with self-care, mobility, lifting and ambulation. Modalities: n/a    Charges:  Timed Code Treatment Minutes: 65   Total Treatment Minutes:  65   BWC:  TE TIME:  NMR TIME:  MANUAL TIME:  UNTIMED MINUTES:   -  -  -  -      [] EVAL (LOW) 30753 (typically 20 minutes face-to-face)  [] EVAL (MOD) 11439 (typically 30 minutes face-to-face)  [] EVAL (HIGH) 71672 (typically 45 minutes face-to-face)  [] RE-EVAL     [x] QP(79593) x  2  [] IONTO  [x] NMR (66827) x 1  [] VASO  [x] Manual (98756) 1    [] Other:  [] TA x      [] Mech Traction (60824)  [] ES(attended) (95705)      [] ES (un) (84370):    ASSESSMENT:  See eval    GOALS:   Short Term Goals: To be achieved in: 2 weeks  1. Independent in HEP and progression per patient tolerance, in order to prevent re-injury. [x] Progressing: [] Met: [] Not Met: [] Adjusted   2. Patient will have a decrease in pain to facilitate improvement in movement, function, and ADLs as indicated by Functional Deficits. [x] Progressing: [] Met: [] Not Met: [] Adjusted     Long Term Goals: To be achieved in: 12 weeks  1. Disability index score of 20% or less for the LEFS to assist with reaching prior level of function. [x] Progressing: [] Met: [] Not Met: [] Adjusted   2. Patient will demonstrate increased AROM of R Knee from 0 -120 °  To all for ease with squatting when lifting items at work to allow for proper joint functioning as indicated by patients Functional Deficits. [x] Progressing: [] Met: [] Not Met: [] Adjusted   3. Patient will demonstrate an increase in strength of R Knee/Hip to 5/5 to be able to squat, lift, and stand for at least 8 hours at work. [x] Progressing: [] Met: [] Not Met: [] Adjusted   4. Patient will return demonstrate improved SLS on LE to >/ 10 seconds to decreased fall risk or risk injury during the work day when having to shift weight to one side when reaching for items. [x] Progressing: [] Met: [] Not Met: [] Adjusted  5. Patient will ambulate without AD with proper heel to toe sequencing gait pattern   [x] Progressing: [] Met: [] Not Met: [] Adjusted  6. Patient will have 0/10 pain with ADL's. [x] Progressing: [] Met: [] Not Met: [] Adjusted   7. Patient stated goal: Return to work with no restrictions. [x] Progressing: [] Met: [] Not Met: [] Adjusted     Overall Progression Towards Functional goals/ Treatment Progress Update:  [x] Patient is progressing as expected towards functional goals listed. [] Progression is slowed due to complexities/Impairments listed. [] Progression has been slowed due to co-morbidities.   [] Plan just implemented, too soon to assess goals progression <30days   [] Goals require adjustment due to lack of progress [] Patient is not progressing as expected and requires additional follow up with physician  [] Other    Prognosis for POC: [x] Good [] Fair  [] Poor    Patient requires continued skilled intervention: [x] Yes  [] No    Treatment/Activity Tolerance:  [x] Patient able to complete treatment  [] Patient limited by fatigue  [] Patient limited by pain    [] Patient limited by other medical complications  [] Other:     Return to Play: (if applicable)   []  Stage 1: Intro to Strength   []  Stage 2: Return to Run and Strength   []  Stage 3: Return to Jump and Strength   []  Stage 4: Dynamic Strength and Agility   []  Stage 5: Sport Specific Training     []  Ready to Return to Play, Meets All Above Stages   []  Not Ready for Return to Sports   Comments:                         PLAN: 2x week/ 10 weeks  [x] Continue per plan of care [] Alter current plan (see comments above)  [] Plan of care initiated [] Hold pending MD visit [] Discharge    Electronically signed by:  Dayana Atkinson PT    Note: If patient does not return for scheduled/ recommended follow up visits, this note will serve as a discharge from care along with most recent update on progress.

## 2021-02-12 ENCOUNTER — HOSPITAL ENCOUNTER (OUTPATIENT)
Dept: PHYSICAL THERAPY | Age: 51
Setting detail: THERAPIES SERIES
Discharge: HOME | End: 2021-02-12
Payer: COMMERCIAL

## 2021-02-12 PROCEDURE — 97140 MANUAL THERAPY 1/> REGIONS: CPT | Performed by: PHYSICAL THERAPY ASSISTANT

## 2021-02-12 PROCEDURE — 97112 NEUROMUSCULAR REEDUCATION: CPT | Performed by: PHYSICAL THERAPY ASSISTANT

## 2021-02-12 PROCEDURE — 97110 THERAPEUTIC EXERCISES: CPT | Performed by: PHYSICAL THERAPY ASSISTANT

## 2021-02-12 NOTE — FLOWSHEET NOTE
[facility], [address] [place] [address] [place], [address]    Physical Therapy Treatment Note/ Progress Report:     Date:  2021    Patient Name:  Sarah Stacy    :  1970  MRN: [id number]  Restrictions/Precautions:    Medical/Treatment Diagnosis Information:  · Diagnosis:   S/p L Partial Medial Meniscectomy 2020  S/p R Partial Medial Meniscectomy  2020  · Treatment Diagnosis: M25.562 / C60.599C  Insurance/Certification information:  PT Insurance Information: [employer]  Physician Information:  Referring Practitioner: Lieutenant Prince  Has the plan of care been signed (Y/N):        []  Yes  [x]  No     Date of Patient follow up with Physician:  21    Is this a Progress Report:     []  Yes  [x]  No      If Yes:  Date Range for reporting period:  Initial Eval: 2020  Beginnin2020 --- Ending: 10/1/2020  Beginnin2020 --- Endin2021    Progress report will be due (10 Rx or 30 days whichever is less):     Recertification will be due (POC Duration  / 90 days whichever is less): 3/29/2021      Visit # Insurance Allowable Auth Required   In Person   10 2020 30 visits ( if more then 30 visits needed Med Review required) []  Yes     []  No    Tele Health -  []  Yes     []  No    Total 21       Functional Scale: LEFS: 70% (Score: 24/80)   Date assessed: 2020  Functional Scale: LEFS: 90% (Score: 8/80)   Date assessed: 2020   Functional Scale: LEFS: 38% (Score: 50/80)   Date assessed: 2021       Latex Allergy:  [x]NO      []YES  Preferred Language for Healthcare:   [x]English       []other:    Pain level: 4-5/10     SUBJECTIVE: Today is the most sore she has been. Monday she returns to 10 hours shifts. MD VISIT 1/26/2021  Continue with physical therapy, return to work 5 days a week 6-hour shifts on February 1, she will maintain his restrictions for 2 weeks before transitioning back to work without restrictions. Follow-up with me as needed. OBJECTIVE: See eval  ? Observation:  · Test measurements:  ROM: 0-122 1/22/2021    RESTRICTIONS/PRECAUTIONS: Partial Med Meniscectomy with Chondroplasty     Exercises/Interventions:   Therapeutic Ex (55928)  Therapeutic Activity (12231)  NMR re-education (60728) Sets/Reps Notes/CUES   Bike 5'         Slant Board 3 x 30\"    HR/TR X30 Airex   Marches  2# X30     HS Curl  / knee ext  20# x20/20# x20       FSU  6\" x20     Leg Press   Leg Press 80# x30 DL  60# x20 SL    ABBY TKE / ABD / EXT 60# x20 / 45# x30 R, L/  45# 20 x R,L     Mini Squat  x20 Airex        Quad Sets 15 x 5\"    Heel Slides 15 x 5\"    SLR 3 x 10 2#    SSLR 3 x 10  2#            Heel Prop 5 mins    SL Clamshell 20 x 5\" green   Bridging  20 x 5\" green        SAQ 30 x  2#        Long Sit Gastroc Stretch 3 x 30\"    Long Sit HS stretch 3 x 30\"                                                      Gait Training 5'                                  Manual Intervention (23017)     Patella Mobs 5'    PROM Knee Flex/Ext 5'                                            Patient Education 5'  Pt ed with HEP and progression of PT tx       Therapeutic Exercise and NMR EXR  [x] (16053) Provided verbal/tactile cueing for activities related to strengthening, flexibility, endurance, ROM for improvements in LE, proximal hip, and core control with self care, mobility, lifting, ambulation. [x] (67496) Provided verbal/tactile cueing for activities related to improving balance, coordination, kinesthetic sense, posture, motor skill, proprioception to assist with LE, proximal hip, and core control in self-care, mobility, lifting, ambulation and eccentric single leg control.      NMR and Therapeutic Activities: [x] (47972 or 75662) Provided verbal/tactile cueing for activities related to improving balance, coordination, kinesthetic sense, posture, motor skill, proprioception and motor activation to allow for proper function of core, proximal hip and LE with self-care and ADLs and functional mobility. [x] (37562) Gait Re-education- Provided training and instruction to the patient for proper LE, core and proximal hip recruitment and positioning and eccentric body weight control with ambulation re-education including up and down stairs     Home Exercise Program:    [x] (04732) Reviewed/Progressed HEP activities related to strengthening, flexibility, endurance, ROM of core, proximal hip and LE for functional self-care, mobility, lifting and ambulation/stair navigation   [x] (50508) Reviewed/Progressed HEP activities related to improving balance, coordination, kinesthetic sense, posture, motor skill, proprioception of core, proximal hip and LE for self-care, mobility, lifting, and ambulation/stair navigation      Manual Treatments:  PROM / STM / Oscillations-Mobs:  G-I, II, III, IV (PA's, Inf., Post.)  [x] (20308) Provided manual therapy to mobilize LE, proximal hip and/or LS spine soft tissue/joints for the purpose of modulating pain, promoting relaxation, increasing ROM, reducing/eliminating soft tissue swelling/inflammation/restriction, improving soft tissue extensibility and allowing for proper ROM for normal function with self-care, mobility, lifting and ambulation.      Modalities: n/a    Charges:  Timed Code Treatment Minutes: 65   Total Treatment Minutes:  65   BWC:  TE TIME:  NMR TIME:  MANUAL TIME:  UNTIMED MINUTES:   -  -  -  -      [] EVAL (LOW) 29789 (typically 20 minutes face-to-face)  [] EVAL (MOD) 00103 (typically 30 minutes face-to-face)  [] EVAL (HIGH) 26428 (typically 45 minutes face-to-face)  [] RE-EVAL     [x] XC(74730) x  2  [] IONTO  [x] NMR (89160) x 1  [] VASO  [x] Manual (20310) 1    [] Other: [] TA x      [] [facility] Traction (47487)  [] ES(attended) (19496)      [] ES (un) (01093):    ASSESSMENT:  See eval    GOALS:   Short Term Goals: To be achieved in: 2 weeks  1. Independent in HEP and progression per patient tolerance, in order to prevent re-injury. [x] Progressing: [] Met: [] Not Met: [] Adjusted   2. Patient will have a decrease in pain to facilitate improvement in movement, function, and ADLs as indicated by Functional Deficits. [x] Progressing: [] Met: [] Not Met: [] Adjusted     Long Term Goals: To be achieved in: 12 weeks  1. Disability index score of 20% or less for the LEFS to assist with reaching prior level of function. [x] Progressing: [] Met: [] Not Met: [] Adjusted   2. Patient will demonstrate increased AROM of R Knee from 0 -120 °  To all for ease with squatting when lifting items at work to allow for proper joint functioning as indicated by patients Functional Deficits. [x] Progressing: [] Met: [] Not Met: [] Adjusted   3. Patient will demonstrate an increase in strength of R Knee/Hip to 5/5 to be able to squat, lift, and stand for at least 8 hours at work. [x] Progressing: [] Met: [] Not Met: [] Adjusted   4. Patient will return demonstrate improved SLS on LE to >/ 10 seconds to decreased fall risk or risk injury during the work day when having to shift weight to one side when reaching for items. [x] Progressing: [] Met: [] Not Met: [] Adjusted  5. Patient will ambulate without AD with proper heel to toe sequencing gait pattern   [x] Progressing: [] Met: [] Not Met: [] Adjusted  6. Patient will have 0/10 pain with ADL's. [x] Progressing: [] Met: [] Not Met: [] Adjusted   7. Patient stated goal: Return to work with no restrictions. [x] Progressing: [] Met: [] Not Met: [] Adjusted     Overall Progression Towards Functional goals/ Treatment Progress Update:  [x] Patient is progressing as expected towards functional goals listed. [] Progression is slowed due to complexities/Impairments listed. [] Progression has been slowed due to co-morbidities. [] Plan just implemented, too soon to assess goals progression <30days   [] Goals require adjustment due to lack of progress  [] Patient is not progressing as expected and requires additional follow up with physician  [] Other    Prognosis for POC: [x] Good [] Fair  [] Poor    Patient requires continued skilled intervention: [x] Yes  [] No    Treatment/Activity Tolerance:  [x] Patient able to complete treatment  [] Patient limited by fatigue  [] Patient limited by pain    [] Patient limited by other medical complications  [] Other:     Return to Play: (if applicable)   []  Stage 1: Intro to Strength   []  Stage 2: Return to Run and Strength   []  Stage 3: Return to Jump and Strength   []  Stage 4: Dynamic Strength and Agility   []  Stage 5: Sport Specific Training     []  Ready to Return to Play, Meets All Above Stages   []  Not Ready for Return to Sports   Comments:                         PLAN: 2x week/ 10 weeks  [x] Continue per plan of care [] Alter current plan (see comments above)  [] Plan of care initiated [] Hold pending MD visit [] Discharge    Electronically signed by:  Chavez Lund PTA    Note: If patient does not return for scheduled/ recommended follow up visits, this note will serve as a discharge from care along with most recent update on progress.

## 2021-02-17 ENCOUNTER — HOSPITAL ENCOUNTER (OUTPATIENT)
Dept: PHYSICAL THERAPY | Age: 51
Setting detail: THERAPIES SERIES
Discharge: HOME | End: 2021-02-17
Payer: COMMERCIAL

## 2021-02-17 NOTE — FLOWSHEET NOTE
[address] [facility]  Phone: [phone]   Fax:     [phone]    Physical Therapy  Cancellation/No-show Note  Patient Name:  Arian José  :  1970   Date:  2021    Cancelled visits to date: 2  No-shows to date: 0    For today's appointment patient:  []  Cancelled  []  Rescheduled appointment  []  No-show     Reason given by patient:  [x]  Patient ill  []  Conflicting appointment  []  No transportation    []  Conflict with work  []  No reason given  [x]  Other:     Comments: Patient twisted her ankle yesterday    Phone call information:   []  Phone call made today to patient at _ time at number provided:      []  Patient answered, conversation as follows:    []  Patient did not answer, message left as follows:  []  Phone call not made today  [x]  Phone call not needed - pt contacted us to cancel and provided reason for cancellation.      Electronically signed by:  Germania Ravi PT

## 2021-02-19 ENCOUNTER — HOSPITAL ENCOUNTER (OUTPATIENT)
Dept: PHYSICAL THERAPY | Age: 51
Setting detail: THERAPIES SERIES
Discharge: HOME | End: 2021-02-19
Payer: COMMERCIAL

## 2021-02-19 PROCEDURE — 97140 MANUAL THERAPY 1/> REGIONS: CPT | Performed by: PHYSICAL THERAPY ASSISTANT

## 2021-02-19 PROCEDURE — 97112 NEUROMUSCULAR REEDUCATION: CPT | Performed by: PHYSICAL THERAPY ASSISTANT

## 2021-02-19 PROCEDURE — 97110 THERAPEUTIC EXERCISES: CPT | Performed by: PHYSICAL THERAPY ASSISTANT

## 2021-02-19 NOTE — FLOWSHEET NOTE
[facility], [address] [place] [address] Carlos Escamilla, [facility]    Physical Therapy Treatment Note/ Progress Report:     Date:  2021    Patient Name:  Linda Paula    :  1970  MRN: [id number]  Restrictions/Precautions:    Medical/Treatment Diagnosis Information:  · Diagnosis:   S/p L Partial Medial Meniscectomy 2020  S/p R Partial Medial Meniscectomy  2020  · Treatment Diagnosis: M25.562 / U46.629W  Insurance/Certification information:  PT Insurance Information: [facility]  Physician Information:  Referring Practitioner: Yudelka Root  Has the plan of care been signed (Y/N):        []  Yes  [x]  No     Date of Patient follow up with Physician:  21    Is this a Progress Report:     []  Yes  [x]  No      If Yes:  Date Range for reporting period:  Initial Eval: 2020  Beginnin2020 --- Ending: 10/1/2020  Beginnin2020 --- Endin2021    Progress report will be due (10 Rx or 30 days whichever is less):     Recertification will be due (POC Duration  / 90 days whichever is less): 3/29/2021      Visit # Insurance Allowable Auth Required   In Person   10 =  30 visits ( if more then 30 visits needed Med Review required) []  Yes     []  No    Tele Health -  []  Yes     []  No    Total 22       Functional Scale: LEFS: 70% (Score: 24/80)   Date assessed: 2020  Functional Scale: LEFS: 90% (Score: 8/80)   Date assessed: 2020   Functional Scale: LEFS: 38% (Score: 50/80)   Date assessed: 2021       Latex Allergy:  [x]NO      []YES  Preferred Language for Healthcare:   [x]English       []other:    Pain level: 4-5/10     SUBJECTIVE: KNee is feeling good - left ankle is a problem as she sprained it and is in an air cast. (Ankle was 21)    MD VISIT 2021  Continue with physical therapy, return to work 5 days a week 6-hour shifts on , she will maintain his restrictions for 2 weeks before transitioning back to work without restrictions. Follow-up with me as needed. OBJECTIVE: See eval   Observation:  · Test measurements:  ROM: 0-122 1/22/2021    RESTRICTIONS/PRECAUTIONS: Partial Med Meniscectomy with Chondroplasty     Exercises/Interventions:   Therapeutic Ex (42079)  Therapeutic Activity (66254)  NMR re-education (57454) Sets/Reps Notes/CUES   Bike 5'         Slant Board    HR/TR Airex   Marches       HS Curl  / knee ext  20# x20/20# x20       FSU  6\" x20  Right only   Leg Press   Leg Press 80# x30 DL  60# x20 SL    ABBY TKE / ABD / EXT 60# x20 /   Held secondary to left ankle   Mini Squat  x20         Quad Sets 15 x 5\"    Heel Slides 15 x 5\"    SLR 3 x 10 2#    SSLR 3 x 10  2#            Heel Prop 5 mins    SL Clamshell 20 x 5\" green   Bridging  20 x 5\" green        SAQ 20 x  2#        Long Sit Gastroc Stretch 3 x 30\"    Long Sit HS stretch 3 x 30\"                                                      Gait Training 5'                                  Manual Intervention (20742)     Patella Mobs 5'    PROM Knee Flex/Ext 5'                                            Patient Education 5'  Pt ed with HEP and progression of PT tx       Therapeutic Exercise and NMR EXR  [x] (33892) Provided verbal/tactile cueing for activities related to strengthening, flexibility, endurance, ROM for improvements in LE, proximal hip, and core control with self care, mobility, lifting, ambulation. [x] (47946) Provided verbal/tactile cueing for activities related to improving balance, coordination, kinesthetic sense, posture, motor skill, proprioception to assist with LE, proximal hip, and core control in self-care, mobility, lifting, ambulation and eccentric single leg control.      NMR and Therapeutic Activities:    [x] (06919 or 72543) Provided verbal/tactile cueing for activities related to improving balance, coordination, kinesthetic sense, posture, motor skill, proprioception and motor activation to allow for proper function of core, proximal hip and LE with self-care and ADLs and functional mobility. [x] (87482) Gait Re-education- Provided training and instruction to the patient for proper LE, core and proximal hip recruitment and positioning and eccentric body weight control with ambulation re-education including up and down stairs     Home Exercise Program:    [x] (68598) Reviewed/Progressed HEP activities related to strengthening, flexibility, endurance, ROM of core, proximal hip and LE for functional self-care, mobility, lifting and ambulation/stair navigation   [x] (01761) Reviewed/Progressed HEP activities related to improving balance, coordination, kinesthetic sense, posture, motor skill, proprioception of core, proximal hip and LE for self-care, mobility, lifting, and ambulation/stair navigation      Manual Treatments:  PROM / STM / Oscillations-Mobs:  G-I, II, III, IV (PA's, Inf., Post.)  [x] (85284) Provided manual therapy to mobilize LE, proximal hip and/or LS spine soft tissue/joints for the purpose of modulating pain, promoting relaxation, increasing ROM, reducing/eliminating soft tissue swelling/inflammation/restriction, improving soft tissue extensibility and allowing for proper ROM for normal function with self-care, mobility, lifting and ambulation. Modalities: n/a    Charges:  Timed Code Treatment Minutes: 54   Total Treatment Minutes:  54   BWC:  TE TIME:  NMR TIME:  MANUAL TIME:  UNTIMED MINUTES:   -  -  -  -      [] EVAL (LOW) 25615 (typically 20 minutes face-to-face)  [] EVAL (MOD) 76877 (typically 30 minutes face-to-face)  [] EVAL (HIGH) 86537 (typically 45 minutes face-to-face)  [] RE-EVAL     [x] CK(08875) x  2  [] IONTO  [x] NMR (53382) x 1  [] VASO  [x] Manual (31821) 1    [] Other:  [] TA x      [] Mech Traction (71884)  [] ES(attended) (46389)      [] ES (un) (41160):    ASSESSMENT:  See eval    GOALS:   Short Term Goals: To be achieved in: 2 weeks  1.  Independent in HEP and progression per patient tolerance, in order to prevent re-injury. [x] Progressing: [] Met: [] Not Met: [] Adjusted   2. Patient will have a decrease in pain to facilitate improvement in movement, function, and ADLs as indicated by Functional Deficits. [x] Progressing: [] Met: [] Not Met: [] Adjusted     Long Term Goals: To be achieved in: 12 weeks  1. Disability index score of 20% or less for the LEFS to assist with reaching prior level of function. [x] Progressing: [] Met: [] Not Met: [] Adjusted   2. Patient will demonstrate increased AROM of R Knee from 0 -120 °  To all for ease with squatting when lifting items at work to allow for proper joint functioning as indicated by patients Functional Deficits. [x] Progressing: [] Met: [] Not Met: [] Adjusted   3. Patient will demonstrate an increase in strength of R Knee/Hip to 5/5 to be able to squat, lift, and stand for at least 8 hours at work. [x] Progressing: [] Met: [] Not Met: [] Adjusted   4. Patient will return demonstrate improved SLS on LE to >/ 10 seconds to decreased fall risk or risk injury during the work day when having to shift weight to one side when reaching for items. [x] Progressing: [] Met: [] Not Met: [] Adjusted  5. Patient will ambulate without AD with proper heel to toe sequencing gait pattern   [x] Progressing: [] Met: [] Not Met: [] Adjusted  6. Patient will have 0/10 pain with ADL's. [x] Progressing: [] Met: [] Not Met: [] Adjusted   7. Patient stated goal: Return to work with no restrictions. [x] Progressing: [] Met: [] Not Met: [] Adjusted     Overall Progression Towards Functional goals/ Treatment Progress Update:  [x] Patient is progressing as expected towards functional goals listed. [] Progression is slowed due to complexities/Impairments listed. [] Progression has been slowed due to co-morbidities.   [] Plan just implemented, too soon to assess goals progression <30days   [] Goals require adjustment due to lack of progress  [] Patient is not progressing as expected and requires additional follow up with physician  [] Other    Prognosis for POC: [x] Good [] Fair  [] Poor    Patient requires continued skilled intervention: [x] Yes  [] No    Treatment/Activity Tolerance:  [x] Patient able to complete treatment  [] Patient limited by fatigue  [] Patient limited by pain    [] Patient limited by other medical complications  [] Other:     Return to Play: (if applicable)   []  Stage 1: Intro to Strength   []  Stage 2: Return to Run and Strength   []  Stage 3: Return to Jump and Strength   []  Stage 4: Dynamic Strength and Agility   []  Stage 5: Sport Specific Training     []  Ready to Return to Play, Meets All Above Stages   []  Not Ready for Return to Sports   Comments:                         PLAN: 2x week/ 10 weeks  [x] Continue per plan of care [] Alter current plan (see comments above)  [] Plan of care initiated [] Hold pending MD visit [] Discharge    Electronically signed by:  Shaina Caceres PTA    Note: If patient does not return for scheduled/ recommended follow up visits, this note will serve as a discharge from care along with most recent update on progress.

## 2021-02-24 ENCOUNTER — HOSPITAL ENCOUNTER (OUTPATIENT)
Dept: PHYSICAL THERAPY | Age: 51
Setting detail: THERAPIES SERIES
Discharge: HOME | End: 2021-02-24
Payer: COMMERCIAL

## 2021-02-24 PROCEDURE — 97110 THERAPEUTIC EXERCISES: CPT | Performed by: PHYSICAL THERAPY ASSISTANT

## 2021-02-24 PROCEDURE — 97140 MANUAL THERAPY 1/> REGIONS: CPT | Performed by: PHYSICAL THERAPY ASSISTANT

## 2021-02-24 PROCEDURE — 97112 NEUROMUSCULAR REEDUCATION: CPT | Performed by: PHYSICAL THERAPY ASSISTANT

## 2021-02-24 NOTE — FLOWSHEET NOTE
[facility], [address] [place] [address], [facility]    Physical Therapy Treatment Note/ Progress Report:     Date:  2021    Patient Name:  Brandie Nguyen    :  1970  MRN: [id number]  Restrictions/Precautions:    Medical/Treatment Diagnosis Information:  · Diagnosis:   S/p L Partial Medial Meniscectomy 2020  S/p R Partial Medial Meniscectomy  2020  · Treatment Diagnosis: M25.562 / S05.017K  Insurance/Certification information:  PT Insurance Information: [facility]  Physician Information:  Referring Practitioner: Latha Silva  Has the plan of care been signed (Y/N):        []  Yes  [x]  No     Date of Patient follow up with Physician:  21    Is this a Progress Report:     []  Yes  [x]  No      If Yes:  Date Range for reporting period:  Initial Eval: 2020  Beginnin2020 --- Ending: 10/1/2020  Beginnin2020 --- Endin2021    Progress report will be due (10 Rx or 30 days whichever is less): 1282    Recertification will be due (POC Duration  / 90 days whichever is less): 3/29/2021      Visit # Insurance Allowable Auth Required   In Person   10 2020 30 visits ( if more then 30 visits needed Med Review required) []  Yes     []  No    Tele Health -  []  Yes     []  No    Total 23       Functional Scale: LEFS: 70% (Score: 24/80)   Date assessed: 2020  Functional Scale: LEFS: 90% (Score: 8/80)   Date assessed: 2020   Functional Scale: LEFS: 38% (Score: 50/80)   Date assessed: 2021       Latex Allergy:  [x]NO      []YES  Preferred Language for Healthcare:   [x]English       []other:    Pain level: 4-5/10     SUBJECTIVE: Knee is feeling better. Significantly less discomfort. MD VISIT 2021  Continue with physical therapy, return to work 5 days a week 6-hour shifts on , she will maintain his restrictions for 2 weeks before transitioning back to work without restrictions. Follow-up with me as needed. OBJECTIVE: See eval  ? Observation:  · Test measurements:  ROM: 0-122 1/22/2021    RESTRICTIONS/PRECAUTIONS: Partial Med Meniscectomy with Chondroplasty     Exercises/Interventions:   Therapeutic Ex (82085)  Therapeutic Activity (56770)  NMR re-education (79179) Sets/Reps Notes/CUES   Bike 5'         Slant Board 3 x 30\"    HR/TR X30 Airex   Marches       HS Curl  / knee ext  20# x20/20# x20       FSU  6\" x20  Right only   Leg Press   Leg Press 80# x30 DL  60# x20 SL    ABBY TKE / ABD / EXT 60# x20 / 45# x30 R, L/  45# 20 x R,L  Held secondary to left ankle   Mini Squat  x20         Quad Sets 15 x 5\"    Heel Slides 15 x 5\"    SLR 3 x 10 2#    SSLR 3 x 10  2#            Heel Prop 5 mins    SL Clamshell 20 x 5\" green   Bridging  20 x 5\" green        SAQ 20 x  2#        Long Sit Gastroc Stretch 3 x 30\"    Long Sit HS stretch 3 x 30\"                                                      Gait Training 5'                                  Manual Intervention (82644)     Patella Mobs 5'    PROM Knee Flex/Ext 5'                                            Patient Education 5'  Pt ed with HEP and progression of PT tx       Therapeutic Exercise and NMR EXR  [x] (09466) Provided verbal/tactile cueing for activities related to strengthening, flexibility, endurance, ROM for improvements in LE, proximal hip, and core control with self care, mobility, lifting, ambulation. [x] (75662) Provided verbal/tactile cueing for activities related to improving balance, coordination, kinesthetic sense, posture, motor skill, proprioception to assist with LE, proximal hip, and core control in self-care, mobility, lifting, ambulation and eccentric single leg control.      NMR and Therapeutic Activities: [x] (77178 or 70340) Provided verbal/tactile cueing for activities related to improving balance, coordination, kinesthetic sense, posture, motor skill, proprioception and motor activation to allow for proper function of core, proximal hip and LE with self-care and ADLs and functional mobility. [x] (17648) Gait Re-education- Provided training and instruction to the patient for proper LE, core and proximal hip recruitment and positioning and eccentric body weight control with ambulation re-education including up and down stairs     Home Exercise Program:    [x] (95024) Reviewed/Progressed HEP activities related to strengthening, flexibility, endurance, ROM of core, proximal hip and LE for functional self-care, mobility, lifting and ambulation/stair navigation   [x] (16817) Reviewed/Progressed HEP activities related to improving balance, coordination, kinesthetic sense, posture, motor skill, proprioception of core, proximal hip and LE for self-care, mobility, lifting, and ambulation/stair navigation      Manual Treatments:  PROM / STM / Oscillations-Mobs:  G-I, II, III, IV (PA's, Inf., Post.)  [x] (69605) Provided manual therapy to mobilize LE, proximal hip and/or LS spine soft tissue/joints for the purpose of modulating pain, promoting relaxation, increasing ROM, reducing/eliminating soft tissue swelling/inflammation/restriction, improving soft tissue extensibility and allowing for proper ROM for normal function with self-care, mobility, lifting and ambulation.      Modalities: n/a    Charges:  Timed Code Treatment Minutes: 54   Total Treatment Minutes:  54   BWC:  TE TIME:  NMR TIME:  MANUAL TIME:  UNTIMED MINUTES:   -  -  -  -      [] EVAL (LOW) 68058 (typically 20 minutes face-to-face)  [] EVAL (MOD) 69348 (typically 30 minutes face-to-face)  [] EVAL (HIGH) 17950 (typically 45 minutes face-to-face)  [] RE-EVAL     [x] IB(50999) x  2  [] IONTO  [x] NMR (82705) x 1  [] VASO  [x] Manual (80679) 1    [] Other: [] TA x      [] [facility] Traction (38992)  [] ES(attended) (89341)      [] ES (un) (54799):    ASSESSMENT:  See eval    GOALS:   Short Term Goals: To be achieved in: 2 weeks  1. Independent in HEP and progression per patient tolerance, in order to prevent re-injury. [x] Progressing: [] Met: [] Not Met: [] Adjusted   2. Patient will have a decrease in pain to facilitate improvement in movement, function, and ADLs as indicated by Functional Deficits. [x] Progressing: [] Met: [] Not Met: [] Adjusted     Long Term Goals: To be achieved in: 12 weeks  1. Disability index score of 20% or less for the LEFS to assist with reaching prior level of function. [x] Progressing: [] Met: [] Not Met: [] Adjusted   2. Patient will demonstrate increased AROM of R Knee from 0 -120 °  To all for ease with squatting when lifting items at work to allow for proper joint functioning as indicated by patients Functional Deficits. [x] Progressing: [] Met: [] Not Met: [] Adjusted   3. Patient will demonstrate an increase in strength of R Knee/Hip to 5/5 to be able to squat, lift, and stand for at least 8 hours at work. [x] Progressing: [] Met: [] Not Met: [] Adjusted   4. Patient will return demonstrate improved SLS on LE to >/ 10 seconds to decreased fall risk or risk injury during the work day when having to shift weight to one side when reaching for items. [x] Progressing: [] Met: [] Not Met: [] Adjusted  5. Patient will ambulate without AD with proper heel to toe sequencing gait pattern   [x] Progressing: [] Met: [] Not Met: [] Adjusted  6. Patient will have 0/10 pain with ADL's. [x] Progressing: [] Met: [] Not Met: [] Adjusted   7. Patient stated goal: Return to work with no restrictions. [x] Progressing: [] Met: [] Not Met: [] Adjusted     Overall Progression Towards Functional goals/ Treatment Progress Update:  [x] Patient is progressing as expected towards functional goals listed. [] Progression is slowed due to complexities/Impairments listed. [] Progression has been slowed due to co-morbidities. [] Plan just implemented, too soon to assess goals progression <30days   [] Goals require adjustment due to lack of progress  [] Patient is not progressing as expected and requires additional follow up with physician  [] Other    Prognosis for POC: [x] Good [] Fair  [] Poor    Patient requires continued skilled intervention: [x] Yes  [] No    Treatment/Activity Tolerance:  [x] Patient able to complete treatment  [] Patient limited by fatigue  [] Patient limited by pain    [] Patient limited by other medical complications  [] Other:     Return to Play: (if applicable)   []  Stage 1: Intro to Strength   []  Stage 2: Return to Run and Strength   []  Stage 3: Return to Jump and Strength   []  Stage 4: Dynamic Strength and Agility   []  Stage 5: Sport Specific Training     []  Ready to Return to Play, Meets All Above Stages   []  Not Ready for Return to Sports   Comments:                         PLAN: 2x week/ 10 weeks  [x] Continue per plan of care [] Alter current plan (see comments above)  [] Plan of care initiated [] Hold pending MD visit [] Discharge    Electronically signed by:  Jovany Kim PTA    Note: If patient does not return for scheduled/ recommended follow up visits, this note will serve as a discharge from care along with most recent update on progress.

## 2021-02-26 ENCOUNTER — HOSPITAL ENCOUNTER (OUTPATIENT)
Dept: PHYSICAL THERAPY | Age: 51
Setting detail: THERAPIES SERIES
Discharge: HOME | End: 2021-02-26
Payer: COMMERCIAL

## 2021-02-26 PROCEDURE — 97112 NEUROMUSCULAR REEDUCATION: CPT | Performed by: PHYSICAL THERAPY ASSISTANT

## 2021-02-26 PROCEDURE — 97140 MANUAL THERAPY 1/> REGIONS: CPT | Performed by: PHYSICAL THERAPY ASSISTANT

## 2021-02-26 PROCEDURE — 97110 THERAPEUTIC EXERCISES: CPT | Performed by: PHYSICAL THERAPY ASSISTANT

## 2021-02-26 NOTE — FLOWSHEET NOTE
[facility], [address] [place] [address] Carlos Escamilla, [id number]    Physical Therapy Treatment Note/ Progress Report:     Date:  2021    Patient Name:  Arian José    :  1970  MRN: [id number]  Restrictions/Precautions:    Medical/Treatment Diagnosis Information:  · Diagnosis:   S/p L Partial Medial Meniscectomy 2020  S/p R Partial Medial Meniscectomy  2020  · Treatment Diagnosis: M25.562 / B41.130R  Insurance/Certification information:  PT Insurance Information: [facility]  Physician Information:  Referring Practitioner: Bryant Cash  Has the plan of care been signed (Y/N):        []  Yes  [x]  No     Date of Patient follow up with Physician:  21    Is this a Progress Report:     []  Yes  [x]  No      If Yes:  Date Range for reporting period:  Initial Eval: 2020  Beginnin2020 --- Ending: 10/1/2020  Beginnin2020 --- Endin2021    Progress report will be due (10 Rx or 30 days whichever is less):     Recertification will be due (POC Duration  / 90 days whichever is less): 3/29/2021      Visit # [id number] C[place]ano Required   In Person   10 = 2020 30 visits ( if more then 30 visits needed Med Review required) []  Yes     []  No    Tele Health -  []  Yes     []  No    Total 24       Functional Scale: LEFS: 70% (Score: 24/80)   Date assessed: 2020  Functional Scale: LEFS: 90% (Score: 8/80)   Date assessed: 2020   Functional Scale: LEFS: 38% (Score: 50/80)   Date assessed: 2021    Functional Scale:  LEFS 71 = 11%     Date assessed:  2021    Latex Allergy:  [x]NO      []YES  Preferred Language for Healthcare:   [x]English       []other:    Pain level: 1-2/10     SUBJECTIVE: Knee is doing well. States that she has been on her feet a lot the last few days so her ankle is sore.         MD VISIT 2021  Continue with physical therapy, return to work 5 days a week 6-hour shifts on , she will maintain his restrictions for 2 weeks before transitioning back to work without restrictions. Follow-up with me as needed. OBJECTIVE: See eval   Observation:  · Test measurements:  ROM: 0-122 2/26/2021  Hip flexion 5/5  Knee flexion 5/5  Knee extension 5/5    RESTRICTIONS/PRECAUTIONS: Partial Med Meniscectomy with Chondroplasty     Exercises/Interventions:   Therapeutic Ex (78883)  Therapeutic Activity (09139)  NMR re-education (35396) Sets/Reps Notes/CUES   Bike 5'         Slant Board 3 x 30\"    HR/TR X30 Airex   Marches       HS Curl  / knee ext  20# x20/20# x20       FSU  6\" x20  Right only   Leg Press   Leg Press 80# x30 DL  60# x20 SL    ABBY TKE / ABD / EXT 60# x20 / 45# x30 R, L/  45# 20 x R,L  Held secondary to left ankle   Mini Squat  x20         Quad Sets 15 x 5\"    Heel Slides 15 x 5\"    SLR 3 x 10 2#    SSLR 3 x 10  2#            Heel Prop 5 mins    SL Clamshell 20 x 5\" green   Bridging  20 x 5\" green        SAQ 20 x  2#        Long Sit Gastroc Stretch 3 x 30\"    Long Sit HS stretch 3 x 30\"                                                      Gait Training 5'                                  Manual Intervention (05292)     Patella Mobs 5'    PROM Knee Flex/Ext 5'                                            Patient Education 5'  Pt ed with HEP and progression of PT tx       Therapeutic Exercise and NMR EXR  [x] (47709) Provided verbal/tactile cueing for activities related to strengthening, flexibility, endurance, ROM for improvements in LE, proximal hip, and core control with self care, mobility, lifting, ambulation. [x] (22974) Provided verbal/tactile cueing for activities related to improving balance, coordination, kinesthetic sense, posture, motor skill, proprioception to assist with LE, proximal hip, and core control in self-care, mobility, lifting, ambulation and eccentric single leg control.      NMR and Therapeutic Activities:    [x] (49376 or 65802) Provided verbal/tactile cueing for activities related to improving balance, See eval    GOALS:   Short Term Goals: To be achieved in: 2 weeks  1. Independent in HEP and progression per patient tolerance, in order to prevent re-injury. [] Progressing: [x] Met: [] Not Met: [] Adjusted   2. Patient will have a decrease in pain to facilitate improvement in movement, function, and ADLs as indicated by Functional Deficits. [] Progressing: [x] Met: [] Not Met: [] Adjusted     Long Term Goals: To be achieved in: 12 weeks  1. Disability index score of 20% or less for the LEFS to assist with reaching prior level of function. [] Progressing: [x] Met: [] Not Met: [] Adjusted   2. Patient will demonstrate increased AROM of R Knee from 0 -120 °  To all for ease with squatting when lifting items at work to allow for proper joint functioning as indicated by patients Functional Deficits. [] Progressing: [x] Met: [] Not Met: [] Adjusted   3. Patient will demonstrate an increase in strength of R Knee/Hip to 5/5 to be able to squat, lift, and stand for at least 8 hours at work. [] Progressing: [x] Met: [] Not Met: [] Adjusted   4. Patient will return demonstrate improved SLS on LE to >/ 10 seconds to decreased fall risk or risk injury during the work day when having to shift weight to one side when reaching for items. [] Progressing: [x] Met: [] Not Met: [] Adjusted  5. Patient will ambulate without AD with proper heel to toe sequencing gait pattern   [] Progressing: [x] Met: [] Not Met: [] Adjusted  6. Patient will have 0/10 pain with ADL's.  [] Progressing: [x] Met: [] Not Met: [] Adjusted   7. Patient stated goal: Return to work with no restrictions. [] Progressing: [x] Met: [] Not Met: [] Adjusted     Overall Progression Towards Functional goals/ Treatment Progress Update:  [x] Patient is progressing as expected towards functional goals listed. [] Progression is slowed due to complexities/Impairments listed. [] Progression has been slowed due to co-morbidities.   [] Plan just implemented, too soon to assess goals progression <30days   [] Goals require adjustment due to lack of progress  [] Patient is not progressing as expected and requires additional follow up with physician  [] Other    Prognosis for POC: [x] Good [] Fair  [] Poor    Patient requires continued skilled intervention: [x] Yes  [] No    Treatment/Activity Tolerance:  [x] Patient able to complete treatment  [] Patient limited by fatigue  [] Patient limited by pain    [] Patient limited by other medical complications  [] Other:     Return to Play: (if applicable)   []  Stage 1: Intro to Strength   []  Stage 2: Return to Run and Strength   []  Stage 3: Return to Jump and Strength   []  Stage 4: Dynamic Strength and Agility   []  Stage 5: Sport Specific Training     []  Ready to Return to Play, Meets All Above Stages   []  Not Ready for Return to Sports   Comments:                         PLAN: 2x week/ 10 weeks  [] Continue per plan of care [] Alter current plan (see comments above)  [] Plan of care initiated [] Hold pending MD visit [x] Discharge    Electronically signed by:  Nicole Magdaleno PTA    Note: If patient does not return for scheduled/ recommended follow up visits, this note will serve as a discharge from care along with most recent update on progress.

## 2023-06-27 SDOH — HEALTH STABILITY: PHYSICAL HEALTH: ON AVERAGE, HOW MANY MINUTES DO YOU ENGAGE IN EXERCISE AT THIS LEVEL?: 20 MIN

## 2023-06-27 SDOH — HEALTH STABILITY: PHYSICAL HEALTH: ON AVERAGE, HOW MANY DAYS PER WEEK DO YOU ENGAGE IN MODERATE TO STRENUOUS EXERCISE (LIKE A BRISK WALK)?: 2 DAYS

## 2023-06-28 ENCOUNTER — OFFICE VISIT (OUTPATIENT)
Dept: FAMILY MEDICINE CLINIC | Age: 53
End: 2023-06-28
Payer: COMMERCIAL

## 2023-06-28 VITALS
SYSTOLIC BLOOD PRESSURE: 104 MMHG | DIASTOLIC BLOOD PRESSURE: 81 MMHG | BODY MASS INDEX: 38.93 KG/M2 | OXYGEN SATURATION: 98 % | WEIGHT: 228 LBS | HEIGHT: 64 IN | HEART RATE: 81 BPM

## 2023-06-28 DIAGNOSIS — Z12.31 ENCOUNTER FOR SCREENING MAMMOGRAM FOR MALIGNANT NEOPLASM OF BREAST: ICD-10-CM

## 2023-06-28 DIAGNOSIS — E55.9 VITAMIN D DEFICIENCY: ICD-10-CM

## 2023-06-28 DIAGNOSIS — Z76.89 ENCOUNTER TO ESTABLISH CARE: Primary | ICD-10-CM

## 2023-06-28 DIAGNOSIS — M21.612 BUNION, LEFT FOOT: ICD-10-CM

## 2023-06-28 DIAGNOSIS — M17.0 BILATERAL PRIMARY OSTEOARTHRITIS OF KNEE: ICD-10-CM

## 2023-06-28 DIAGNOSIS — Z13.1 DIABETES MELLITUS SCREENING: ICD-10-CM

## 2023-06-28 LAB
25(OH)D3 SERPL-MCNC: 26.5 NG/ML
ALBUMIN SERPL-MCNC: 4.2 G/DL (ref 3.4–5)
ALBUMIN/GLOB SERPL: 1.4 {RATIO} (ref 1.1–2.2)
ALP SERPL-CCNC: 67 U/L (ref 40–129)
ALT SERPL-CCNC: 16 U/L (ref 10–40)
ANION GAP SERPL CALCULATED.3IONS-SCNC: 13 MMOL/L (ref 3–16)
AST SERPL-CCNC: 14 U/L (ref 15–37)
BASOPHILS # BLD: 0.1 K/UL (ref 0–0.2)
BASOPHILS NFR BLD: 1.3 %
BILIRUB SERPL-MCNC: 0.3 MG/DL (ref 0–1)
BUN SERPL-MCNC: 14 MG/DL (ref 7–20)
CALCIUM SERPL-MCNC: 10 MG/DL (ref 8.3–10.6)
CHLORIDE SERPL-SCNC: 106 MMOL/L (ref 99–110)
CHOLEST SERPL-MCNC: 155 MG/DL (ref 0–199)
CO2 SERPL-SCNC: 22 MMOL/L (ref 21–32)
CREAT SERPL-MCNC: 0.8 MG/DL (ref 0.6–1.1)
DEPRECATED RDW RBC AUTO: 14.6 % (ref 12.4–15.4)
EOSINOPHIL # BLD: 0.2 K/UL (ref 0–0.6)
EOSINOPHIL NFR BLD: 3.4 %
GFR SERPLBLD CREATININE-BSD FMLA CKD-EPI: >60 ML/MIN/{1.73_M2}
GLUCOSE SERPL-MCNC: 94 MG/DL (ref 70–99)
HCT VFR BLD AUTO: 39.6 % (ref 36–48)
HDLC SERPL-MCNC: 51 MG/DL (ref 40–60)
HGB BLD-MCNC: 13.3 G/DL (ref 12–16)
LDLC SERPL CALC-MCNC: 89 MG/DL
LYMPHOCYTES # BLD: 1.4 K/UL (ref 1–5.1)
LYMPHOCYTES NFR BLD: 21.9 %
MCH RBC QN AUTO: 28.6 PG (ref 26–34)
MCHC RBC AUTO-ENTMCNC: 33.5 G/DL (ref 31–36)
MCV RBC AUTO: 85.5 FL (ref 80–100)
MONOCYTES # BLD: 0.5 K/UL (ref 0–1.3)
MONOCYTES NFR BLD: 7.5 %
NEUTROPHILS # BLD: 4.2 K/UL (ref 1.7–7.7)
NEUTROPHILS NFR BLD: 65.9 %
PLATELET # BLD AUTO: 343 K/UL (ref 135–450)
PMV BLD AUTO: 10.2 FL (ref 5–10.5)
POTASSIUM SERPL-SCNC: 4.6 MMOL/L (ref 3.5–5.1)
PROT SERPL-MCNC: 7.1 G/DL (ref 6.4–8.2)
RBC # BLD AUTO: 4.63 M/UL (ref 4–5.2)
SODIUM SERPL-SCNC: 141 MMOL/L (ref 136–145)
TRIGL SERPL-MCNC: 74 MG/DL (ref 0–150)
VLDLC SERPL CALC-MCNC: 15 MG/DL
WBC # BLD AUTO: 6.4 K/UL (ref 4–11)

## 2023-06-28 PROCEDURE — 1036F TOBACCO NON-USER: CPT

## 2023-06-28 PROCEDURE — 36415 COLL VENOUS BLD VENIPUNCTURE: CPT

## 2023-06-28 PROCEDURE — 3017F COLORECTAL CA SCREEN DOC REV: CPT

## 2023-06-28 PROCEDURE — G8417 CALC BMI ABV UP PARAM F/U: HCPCS

## 2023-06-28 PROCEDURE — 99203 OFFICE O/P NEW LOW 30 MIN: CPT

## 2023-06-28 PROCEDURE — G8427 DOCREV CUR MEDS BY ELIG CLIN: HCPCS

## 2023-06-28 RX ORDER — CELECOXIB 200 MG/1
200 CAPSULE ORAL DAILY
COMMUNITY
Start: 2023-06-21 | End: 2023-06-28 | Stop reason: SDUPTHER

## 2023-06-28 RX ORDER — CELECOXIB 200 MG/1
200 CAPSULE ORAL DAILY
Qty: 30 CAPSULE | Refills: 3 | Status: SHIPPED | OUTPATIENT
Start: 2023-06-28

## 2023-06-28 SDOH — ECONOMIC STABILITY: HOUSING INSECURITY
IN THE LAST 12 MONTHS, WAS THERE A TIME WHEN YOU DID NOT HAVE A STEADY PLACE TO SLEEP OR SLEPT IN A SHELTER (INCLUDING NOW)?: NO

## 2023-06-28 SDOH — ECONOMIC STABILITY: INCOME INSECURITY: HOW HARD IS IT FOR YOU TO PAY FOR THE VERY BASICS LIKE FOOD, HOUSING, MEDICAL CARE, AND HEATING?: NOT HARD AT ALL

## 2023-06-28 SDOH — ECONOMIC STABILITY: FOOD INSECURITY: WITHIN THE PAST 12 MONTHS, THE FOOD YOU BOUGHT JUST DIDN'T LAST AND YOU DIDN'T HAVE MONEY TO GET MORE.: NEVER TRUE

## 2023-06-28 SDOH — ECONOMIC STABILITY: FOOD INSECURITY: WITHIN THE PAST 12 MONTHS, YOU WORRIED THAT YOUR FOOD WOULD RUN OUT BEFORE YOU GOT MONEY TO BUY MORE.: NEVER TRUE

## 2023-06-28 ASSESSMENT — PATIENT HEALTH QUESTIONNAIRE - PHQ9
1. LITTLE INTEREST OR PLEASURE IN DOING THINGS: 0
SUM OF ALL RESPONSES TO PHQ9 QUESTIONS 1 & 2: 0
SUM OF ALL RESPONSES TO PHQ QUESTIONS 1-9: 0
2. FEELING DOWN, DEPRESSED OR HOPELESS: 0

## 2023-06-28 ASSESSMENT — ENCOUNTER SYMPTOMS
GASTROINTESTINAL NEGATIVE: 1
RESPIRATORY NEGATIVE: 1

## 2023-06-29 DIAGNOSIS — E55.9 VITAMIN D DEFICIENCY: Primary | ICD-10-CM

## 2023-06-29 LAB
EST. AVERAGE GLUCOSE BLD GHB EST-MCNC: 108.3 MG/DL
HBA1C MFR BLD: 5.4 %

## 2023-06-29 RX ORDER — ERGOCALCIFEROL 1.25 MG/1
50000 CAPSULE ORAL WEEKLY
Qty: 12 CAPSULE | Refills: 1 | Status: SHIPPED | OUTPATIENT
Start: 2023-06-29

## 2023-09-05 ENCOUNTER — HOSPITAL ENCOUNTER (OUTPATIENT)
Dept: MAMMOGRAPHY | Age: 53
Discharge: HOME | End: 2023-09-05
Payer: COMMERCIAL

## 2023-09-05 VITALS — BODY MASS INDEX: 39.51 KG/M2 | HEIGHT: 63 IN | WEIGHT: 223 LBS

## 2023-09-05 DIAGNOSIS — Z12.31 VISIT FOR SCREENING MAMMOGRAM: ICD-10-CM

## 2023-09-05 PROCEDURE — 77063 BREAST TOMOSYNTHESIS BI: CPT

## 2023-10-09 ENCOUNTER — COMMUNITY OUTREACH (OUTPATIENT)
Dept: FAMILY MEDICINE CLINIC | Age: 53
End: 2023-10-09

## 2023-12-11 ENCOUNTER — NURSE ONLY (OUTPATIENT)
Dept: FAMILY MEDICINE CLINIC | Age: 53
End: 2023-12-11
Payer: COMMERCIAL

## 2023-12-11 DIAGNOSIS — Z23 NEED FOR IMMUNIZATION AGAINST INFLUENZA: Primary | ICD-10-CM

## 2023-12-11 PROCEDURE — 90674 CCIIV4 VAC NO PRSV 0.5 ML IM: CPT

## 2023-12-11 PROCEDURE — 90471 IMMUNIZATION ADMIN: CPT

## 2023-12-12 DIAGNOSIS — Z12.11 COLON CANCER SCREENING: Primary | ICD-10-CM

## 2024-01-04 ENCOUNTER — OFFICE VISIT (OUTPATIENT)
Dept: FAMILY MEDICINE CLINIC | Age: 54
End: 2024-01-04
Payer: COMMERCIAL

## 2024-01-04 VITALS
HEART RATE: 84 BPM | OXYGEN SATURATION: 97 % | SYSTOLIC BLOOD PRESSURE: 136 MMHG | WEIGHT: 238 LBS | BODY MASS INDEX: 42.16 KG/M2 | DIASTOLIC BLOOD PRESSURE: 86 MMHG

## 2024-01-04 DIAGNOSIS — E55.9 VITAMIN D DEFICIENCY: ICD-10-CM

## 2024-01-04 DIAGNOSIS — Z12.4 CERVICAL CANCER SCREENING: ICD-10-CM

## 2024-01-04 DIAGNOSIS — M21.612 BUNION, LEFT FOOT: ICD-10-CM

## 2024-01-04 DIAGNOSIS — M17.0 BILATERAL PRIMARY OSTEOARTHRITIS OF KNEE: Primary | ICD-10-CM

## 2024-01-04 LAB
25(OH)D3 SERPL-MCNC: 41.2 NG/ML
ALBUMIN SERPL-MCNC: 4.1 G/DL (ref 3.4–5)
ALBUMIN/GLOB SERPL: 1.4 {RATIO} (ref 1.1–2.2)
ALP SERPL-CCNC: 74 U/L (ref 40–129)
ALT SERPL-CCNC: 20 U/L (ref 10–40)
ANION GAP SERPL CALCULATED.3IONS-SCNC: 7 MMOL/L (ref 3–16)
AST SERPL-CCNC: 17 U/L (ref 15–37)
BILIRUB SERPL-MCNC: 0.5 MG/DL (ref 0–1)
BUN SERPL-MCNC: 11 MG/DL (ref 7–20)
CALCIUM SERPL-MCNC: 10 MG/DL (ref 8.3–10.6)
CHLORIDE SERPL-SCNC: 105 MMOL/L (ref 99–110)
CO2 SERPL-SCNC: 27 MMOL/L (ref 21–32)
CREAT SERPL-MCNC: 0.8 MG/DL (ref 0.6–1.1)
GFR SERPLBLD CREATININE-BSD FMLA CKD-EPI: >60 ML/MIN/{1.73_M2}
GLUCOSE SERPL-MCNC: 93 MG/DL (ref 70–99)
POTASSIUM SERPL-SCNC: 4.6 MMOL/L (ref 3.5–5.1)
PROT SERPL-MCNC: 7.1 G/DL (ref 6.4–8.2)
SODIUM SERPL-SCNC: 139 MMOL/L (ref 136–145)

## 2024-01-04 PROCEDURE — G8427 DOCREV CUR MEDS BY ELIG CLIN: HCPCS

## 2024-01-04 PROCEDURE — 99214 OFFICE O/P EST MOD 30 MIN: CPT

## 2024-01-04 PROCEDURE — G8482 FLU IMMUNIZE ORDER/ADMIN: HCPCS

## 2024-01-04 PROCEDURE — G8417 CALC BMI ABV UP PARAM F/U: HCPCS

## 2024-01-04 PROCEDURE — 36415 COLL VENOUS BLD VENIPUNCTURE: CPT

## 2024-01-04 PROCEDURE — 3017F COLORECTAL CA SCREEN DOC REV: CPT

## 2024-01-04 PROCEDURE — 1036F TOBACCO NON-USER: CPT

## 2024-01-04 RX ORDER — CELECOXIB 200 MG/1
200 CAPSULE ORAL DAILY
Qty: 90 CAPSULE | Refills: 3 | Status: SHIPPED | OUTPATIENT
Start: 2024-01-04

## 2024-01-04 ASSESSMENT — PATIENT HEALTH QUESTIONNAIRE - PHQ9
2. FEELING DOWN, DEPRESSED OR HOPELESS: 0
SUM OF ALL RESPONSES TO PHQ QUESTIONS 1-9: 0
SUM OF ALL RESPONSES TO PHQ9 QUESTIONS 1 & 2: 0
1. LITTLE INTEREST OR PLEASURE IN DOING THINGS: 0

## 2024-01-04 ASSESSMENT — ENCOUNTER SYMPTOMS
RESPIRATORY NEGATIVE: 1
GASTROINTESTINAL NEGATIVE: 1

## 2024-01-04 NOTE — PROGRESS NOTES
Chief Complaint   Patient presents with    Check-Up       HPI:  Valentina James is a 53 y.o. (: 1970) here today   for routine checkup.    Taking chronic Celebrex for bilateral osteoarthritis of knees.    History of vitamin D deficiency in the past.  Takes vitamin D 2000 units daily.    Agreeable to see OB/GYN for cervical cancer screening.    Due issues with bunion on left foot.  Needs new podiatry referral.    Patient's medications, allergies, past medical, surgical, social and family histories were reviewed and updated as appropriate.    ROS:  Review of Systems   Constitutional: Negative.    HENT: Negative.     Respiratory: Negative.     Cardiovascular: Negative.    Gastrointestinal: Negative.    Musculoskeletal:  Positive for arthralgias.   Neurological: Negative.    Psychiatric/Behavioral: Negative.             Hemoglobin A1C (%)   Date Value   2023 5.4     LDL Calculated (mg/dL)   Date Value   2023 89       Past Medical History:   Diagnosis Date    History of basal cell carcinoma (BCC)        Family History   Problem Relation Age of Onset    Ovarian Cancer Maternal Cousin         40's       Social History     Socioeconomic History    Marital status:      Spouse name: Not on file    Number of children: Not on file    Years of education: Not on file    Highest education level: Not on file   Occupational History    Not on file   Tobacco Use    Smoking status: Never    Smokeless tobacco: Never   Substance and Sexual Activity    Alcohol use: Never    Drug use: Never    Sexual activity: Not on file   Other Topics Concern    Not on file   Social History Narrative    Not on file     Social Determinants of Health     Financial Resource Strain: Low Risk  (2023)    Overall Financial Resource Strain (CARDIA)     Difficulty of Paying Living Expenses: Not hard at all   Food Insecurity: Not on file (2023)   Transportation Needs: Unknown (2023)    PRAPARE - Transportation     Lack

## 2024-04-23 ENCOUNTER — COMMUNITY OUTREACH (OUTPATIENT)
Dept: FAMILY MEDICINE CLINIC | Age: 54
End: 2024-04-23

## 2024-06-14 ENCOUNTER — OFFICE VISIT (OUTPATIENT)
Dept: FAMILY MEDICINE CLINIC | Age: 54
End: 2024-06-14
Payer: COMMERCIAL

## 2024-06-14 VITALS
WEIGHT: 248 LBS | HEIGHT: 63 IN | OXYGEN SATURATION: 97 % | SYSTOLIC BLOOD PRESSURE: 130 MMHG | TEMPERATURE: 98.8 F | DIASTOLIC BLOOD PRESSURE: 86 MMHG | HEART RATE: 110 BPM | BODY MASS INDEX: 43.94 KG/M2

## 2024-06-14 DIAGNOSIS — J06.9 UPPER RESPIRATORY TRACT INFECTION, UNSPECIFIED TYPE: Primary | ICD-10-CM

## 2024-06-14 PROCEDURE — 3017F COLORECTAL CA SCREEN DOC REV: CPT | Performed by: FAMILY MEDICINE

## 2024-06-14 PROCEDURE — 99213 OFFICE O/P EST LOW 20 MIN: CPT | Performed by: FAMILY MEDICINE

## 2024-06-14 PROCEDURE — 1036F TOBACCO NON-USER: CPT | Performed by: FAMILY MEDICINE

## 2024-06-14 PROCEDURE — G8417 CALC BMI ABV UP PARAM F/U: HCPCS | Performed by: FAMILY MEDICINE

## 2024-06-14 PROCEDURE — G8427 DOCREV CUR MEDS BY ELIG CLIN: HCPCS | Performed by: FAMILY MEDICINE

## 2024-06-14 RX ORDER — BENZONATATE 200 MG/1
200 CAPSULE ORAL 3 TIMES DAILY PRN
Qty: 30 CAPSULE | Refills: 0 | Status: SHIPPED | OUTPATIENT
Start: 2024-06-14 | End: 2024-06-24

## 2024-06-14 RX ORDER — CEFDINIR 300 MG/1
300 CAPSULE ORAL 2 TIMES DAILY
Qty: 20 CAPSULE | Refills: 0 | Status: SHIPPED | OUTPATIENT
Start: 2024-06-14 | End: 2024-06-24

## 2024-06-14 ASSESSMENT — ENCOUNTER SYMPTOMS
NAUSEA: 0
SORE THROAT: 1
VOMITING: 0
BLOOD IN STOOL: 0
SINUS PAIN: 1
CONSTIPATION: 0
COUGH: 1
DIARRHEA: 0

## 2024-06-14 NOTE — PROGRESS NOTES
Expenses: Not hard at all   Food Insecurity: Not on file (6/28/2023)   Transportation Needs: Unknown (6/28/2023)    PRAPARE - Transportation     Lack of Transportation (Medical): Not on file     Lack of Transportation (Non-Medical): No   Physical Activity: Insufficiently Active (6/27/2023)    Exercise Vital Sign     Days of Exercise per Week: 2 days     Minutes of Exercise per Session: 20 min   Stress: Not on file   Social Connections: Not on file   Intimate Partner Violence: Not At Risk (6/27/2023)    Humiliation, Afraid, Rape, and Kick questionnaire     Fear of Current or Ex-Partner: No     Emotionally Abused: No     Physically Abused: No     Sexually Abused: No   Housing Stability: Unknown (6/28/2023)    Housing Stability Vital Sign     Unable to Pay for Housing in the Last Year: Not on file     Number of Places Lived in the Last Year: Not on file     Unstable Housing in the Last Year: No       Prior to Visit Medications    Medication Sig Taking? Authorizing Provider   benzonatate (TESSALON) 200 MG capsule Take 1 capsule by mouth 3 times daily as needed for Cough Yes Jacob James MD   cefdinir (OMNICEF) 300 MG capsule Take 1 capsule by mouth 2 times daily for 10 days Yes Jacob James MD   celecoxib (CELEBREX) 200 MG capsule Take 1 capsule by mouth daily Yes Jose Nix, APRN - CNP   Cholecalciferol (VITAMIN D) 50 MCG (2000 UT) CAPS capsule Take by mouth Yes ProviderMichael MD       Allergies   Allergen Reactions    Pcn [Penicillins] Rash    Percocet [Oxycodone-Acetaminophen] Rash       OBJECTIVE:    /86   Pulse (!) 110   Temp 98.8 °F (37.1 °C) (Tympanic)   Ht 1.6 m (5' 3\")   Wt 112.5 kg (248 lb)   SpO2 97%   BMI 43.93 kg/m²     BP Readings from Last 2 Encounters:   06/14/24 130/86   01/04/24 136/86       Wt Readings from Last 3 Encounters:   06/14/24 112.5 kg (248 lb)   01/04/24 108 kg (238 lb)   09/05/23 101.2 kg (223 lb)       Physical Exam  Constitutional:       Appearance: She

## 2024-07-06 SDOH — ECONOMIC STABILITY: FOOD INSECURITY: WITHIN THE PAST 12 MONTHS, YOU WORRIED THAT YOUR FOOD WOULD RUN OUT BEFORE YOU GOT MONEY TO BUY MORE.: NEVER TRUE

## 2024-07-06 SDOH — ECONOMIC STABILITY: TRANSPORTATION INSECURITY
IN THE PAST 12 MONTHS, HAS LACK OF TRANSPORTATION KEPT YOU FROM MEETINGS, WORK, OR FROM GETTING THINGS NEEDED FOR DAILY LIVING?: NO

## 2024-07-06 SDOH — ECONOMIC STABILITY: FOOD INSECURITY: WITHIN THE PAST 12 MONTHS, THE FOOD YOU BOUGHT JUST DIDN'T LAST AND YOU DIDN'T HAVE MONEY TO GET MORE.: NEVER TRUE

## 2024-07-06 SDOH — ECONOMIC STABILITY: INCOME INSECURITY: HOW HARD IS IT FOR YOU TO PAY FOR THE VERY BASICS LIKE FOOD, HOUSING, MEDICAL CARE, AND HEATING?: NOT HARD AT ALL

## 2024-07-09 ENCOUNTER — OFFICE VISIT (OUTPATIENT)
Dept: FAMILY MEDICINE CLINIC | Age: 54
End: 2024-07-09

## 2024-07-09 VITALS
SYSTOLIC BLOOD PRESSURE: 114 MMHG | WEIGHT: 243 LBS | HEIGHT: 63 IN | HEART RATE: 84 BPM | DIASTOLIC BLOOD PRESSURE: 76 MMHG | BODY MASS INDEX: 43.05 KG/M2

## 2024-07-09 DIAGNOSIS — Z51.81 ENCOUNTER FOR MONITORING CHRONIC NSAID THERAPY: ICD-10-CM

## 2024-07-09 DIAGNOSIS — E55.9 VITAMIN D DEFICIENCY: ICD-10-CM

## 2024-07-09 DIAGNOSIS — Z79.1 ENCOUNTER FOR MONITORING CHRONIC NSAID THERAPY: ICD-10-CM

## 2024-07-09 DIAGNOSIS — E66.01 CLASS 3 SEVERE OBESITY DUE TO EXCESS CALORIES WITH BODY MASS INDEX (BMI) OF 40.0 TO 44.9 IN ADULT, UNSPECIFIED WHETHER SERIOUS COMORBIDITY PRESENT (HCC): ICD-10-CM

## 2024-07-09 DIAGNOSIS — M17.0 BILATERAL PRIMARY OSTEOARTHRITIS OF KNEE: Primary | ICD-10-CM

## 2024-07-09 LAB
25(OH)D3 SERPL-MCNC: 19.4 NG/ML
ALBUMIN SERPL-MCNC: 3.9 G/DL (ref 3.4–5)
ALBUMIN/GLOB SERPL: 1.3 {RATIO} (ref 1.1–2.2)
ALP SERPL-CCNC: 76 U/L (ref 40–129)
ALT SERPL-CCNC: 30 U/L (ref 10–40)
ANION GAP SERPL CALCULATED.3IONS-SCNC: 9 MMOL/L (ref 3–16)
AST SERPL-CCNC: 19 U/L (ref 15–37)
BASOPHILS # BLD: 0.1 K/UL (ref 0–0.2)
BASOPHILS NFR BLD: 0.9 %
BILIRUB SERPL-MCNC: 0.5 MG/DL (ref 0–1)
BUN SERPL-MCNC: 11 MG/DL (ref 7–20)
CALCIUM SERPL-MCNC: 10 MG/DL (ref 8.3–10.6)
CHLORIDE SERPL-SCNC: 107 MMOL/L (ref 99–110)
CO2 SERPL-SCNC: 26 MMOL/L (ref 21–32)
CREAT SERPL-MCNC: 0.8 MG/DL (ref 0.6–1.1)
DEPRECATED RDW RBC AUTO: 14.4 % (ref 12.4–15.4)
EOSINOPHIL # BLD: 0.2 K/UL (ref 0–0.6)
EOSINOPHIL NFR BLD: 3.8 %
GFR SERPLBLD CREATININE-BSD FMLA CKD-EPI: 88 ML/MIN/{1.73_M2}
GLUCOSE SERPL-MCNC: 95 MG/DL (ref 70–99)
HCT VFR BLD AUTO: 41.3 % (ref 36–48)
HGB BLD-MCNC: 13.3 G/DL (ref 12–16)
LYMPHOCYTES # BLD: 1.5 K/UL (ref 1–5.1)
LYMPHOCYTES NFR BLD: 26.3 %
MCH RBC QN AUTO: 27.4 PG (ref 26–34)
MCHC RBC AUTO-ENTMCNC: 32.1 G/DL (ref 31–36)
MCV RBC AUTO: 85.3 FL (ref 80–100)
MONOCYTES # BLD: 0.5 K/UL (ref 0–1.3)
MONOCYTES NFR BLD: 8.3 %
NEUTROPHILS # BLD: 3.4 K/UL (ref 1.7–7.7)
NEUTROPHILS NFR BLD: 60.7 %
PLATELET # BLD AUTO: 303 K/UL (ref 135–450)
PLATELET BLD QL SMEAR: ADEQUATE
PMV BLD AUTO: 10.8 FL (ref 5–10.5)
POTASSIUM SERPL-SCNC: 4.8 MMOL/L (ref 3.5–5.1)
PROT SERPL-MCNC: 6.9 G/DL (ref 6.4–8.2)
RBC # BLD AUTO: 4.85 M/UL (ref 4–5.2)
SLIDE REVIEW: ABNORMAL
SODIUM SERPL-SCNC: 142 MMOL/L (ref 136–145)
WBC # BLD AUTO: 5.6 K/UL (ref 4–11)

## 2024-07-09 ASSESSMENT — ENCOUNTER SYMPTOMS
GASTROINTESTINAL NEGATIVE: 1
RESPIRATORY NEGATIVE: 1

## 2024-07-09 NOTE — PROGRESS NOTES
Alcohol use: Never    Drug use: Never    Sexual activity: Not on file   Other Topics Concern    Not on file   Social History Narrative    Not on file     Social Determinants of Health     Financial Resource Strain: Low Risk  (7/6/2024)    Overall Financial Resource Strain (CARDIA)     Difficulty of Paying Living Expenses: Not hard at all   Food Insecurity: No Food Insecurity (7/6/2024)    Hunger Vital Sign     Worried About Running Out of Food in the Last Year: Never true     Ran Out of Food in the Last Year: Never true   Transportation Needs: Unknown (7/6/2024)    PRAPARE - Transportation     Lack of Transportation (Medical): Not on file     Lack of Transportation (Non-Medical): No   Physical Activity: Insufficiently Active (6/27/2023)    Exercise Vital Sign     Days of Exercise per Week: 2 days     Minutes of Exercise per Session: 20 min   Stress: Not on file   Social Connections: Not on file   Intimate Partner Violence: Not At Risk (6/27/2023)    Humiliation, Afraid, Rape, and Kick questionnaire     Fear of Current or Ex-Partner: No     Emotionally Abused: No     Physically Abused: No     Sexually Abused: No   Housing Stability: Unknown (7/6/2024)    Housing Stability Vital Sign     Unable to Pay for Housing in the Last Year: Not on file     Number of Places Lived in the Last Year: Not on file     Unstable Housing in the Last Year: No       Prior to Visit Medications    Medication Sig Taking? Authorizing Provider   celecoxib (CELEBREX) 200 MG capsule Take 1 capsule by mouth daily  Jose Nix, APRN - CNP   Cholecalciferol (VITAMIN D) 50 MCG (2000 UT) CAPS capsule Take by mouth  Provider, MD Michael       Allergies   Allergen Reactions    Pcn [Penicillins] Rash    Percocet [Oxycodone-Acetaminophen] Rash       OBJECTIVE:    /76   Pulse 84   Ht 1.6 m (5' 3\")   Wt 110.2 kg (243 lb)   BMI 43.05 kg/m²     BP Readings from Last 2 Encounters:   07/09/24 114/76   06/14/24 130/86       Wt Readings from

## 2024-07-10 DIAGNOSIS — E55.9 VITAMIN D DEFICIENCY: Primary | ICD-10-CM

## 2024-07-10 RX ORDER — ERGOCALCIFEROL 1.25 MG/1
50000 CAPSULE ORAL WEEKLY
Qty: 12 CAPSULE | Refills: 1 | Status: SHIPPED | OUTPATIENT
Start: 2024-07-10

## 2024-12-10 ENCOUNTER — OFFICE VISIT (OUTPATIENT)
Dept: FAMILY MEDICINE CLINIC | Age: 54
End: 2024-12-10
Payer: COMMERCIAL

## 2024-12-10 VITALS
OXYGEN SATURATION: 97 % | DIASTOLIC BLOOD PRESSURE: 72 MMHG | WEIGHT: 257 LBS | TEMPERATURE: 98.3 F | BODY MASS INDEX: 45.54 KG/M2 | HEIGHT: 63 IN | HEART RATE: 95 BPM | SYSTOLIC BLOOD PRESSURE: 128 MMHG

## 2024-12-10 DIAGNOSIS — H66.91 RIGHT ACUTE OTITIS MEDIA: Primary | ICD-10-CM

## 2024-12-10 PROCEDURE — G8484 FLU IMMUNIZE NO ADMIN: HCPCS | Performed by: FAMILY MEDICINE

## 2024-12-10 PROCEDURE — 99213 OFFICE O/P EST LOW 20 MIN: CPT | Performed by: FAMILY MEDICINE

## 2024-12-10 PROCEDURE — 3017F COLORECTAL CA SCREEN DOC REV: CPT | Performed by: FAMILY MEDICINE

## 2024-12-10 PROCEDURE — 1036F TOBACCO NON-USER: CPT | Performed by: FAMILY MEDICINE

## 2024-12-10 PROCEDURE — G8427 DOCREV CUR MEDS BY ELIG CLIN: HCPCS | Performed by: FAMILY MEDICINE

## 2024-12-10 PROCEDURE — G8417 CALC BMI ABV UP PARAM F/U: HCPCS | Performed by: FAMILY MEDICINE

## 2024-12-10 RX ORDER — AZITHROMYCIN 250 MG/1
TABLET, FILM COATED ORAL
Qty: 6 TABLET | Refills: 0 | Status: SHIPPED | OUTPATIENT
Start: 2024-12-10 | End: 2024-12-20

## 2024-12-10 NOTE — PROGRESS NOTES
Valentina James (:  1970) is a 54 y.o. female,Established patient, here for evaluation of the following chief complaint(s):  No chief complaint on file.         Assessment & Plan  Right acute otitis media    Take the z-pac as directed.     Orders:    azithromycin (ZITHROMAX) 250 MG tablet; 500mg on day 1 followed by 250mg on days 2 - 5      No follow-ups on file.       Subjective   HPI  54-year-old female comes in for an acute office visit.  Complains of cough cold congestion x 5 days.  Denies fevers chills sweats.  No nausea or vomiting.  She has been using Robitussin and Vicks vapor rub.  Review of Systems   As above otherwise negative    Objective   Physical Exam  HENT:      Head: Normocephalic and atraumatic.      Comments: Right TM opacified  Eyes:      Extraocular Movements: Extraocular movements intact.   Cardiovascular:      Rate and Rhythm: Normal rate and regular rhythm.   Pulmonary:      Effort: Pulmonary effort is normal.      Breath sounds: No wheezing, rhonchi or rales.   Musculoskeletal:      Cervical back: Neck supple.   Neurological:      Mental Status: She is alert and oriented to person, place, and time.                  An electronic signature was used to authenticate this note.    --Eric Vivar MD

## 2025-01-08 ENCOUNTER — OFFICE VISIT (OUTPATIENT)
Dept: FAMILY MEDICINE CLINIC | Age: 55
End: 2025-01-08

## 2025-01-08 VITALS
BODY MASS INDEX: 44.61 KG/M2 | HEIGHT: 63 IN | HEART RATE: 88 BPM | SYSTOLIC BLOOD PRESSURE: 118 MMHG | WEIGHT: 251.8 LBS | DIASTOLIC BLOOD PRESSURE: 68 MMHG | OXYGEN SATURATION: 96 %

## 2025-01-08 DIAGNOSIS — Z87.42 HISTORY OF PCOS: ICD-10-CM

## 2025-01-08 DIAGNOSIS — M21.612 BILATERAL BUNIONS: ICD-10-CM

## 2025-01-08 DIAGNOSIS — E66.813 CLASS 3 SEVERE OBESITY DUE TO EXCESS CALORIES WITH BODY MASS INDEX (BMI) OF 40.0 TO 44.9 IN ADULT, UNSPECIFIED WHETHER SERIOUS COMORBIDITY PRESENT: ICD-10-CM

## 2025-01-08 DIAGNOSIS — E55.9 VITAMIN D DEFICIENCY: Primary | ICD-10-CM

## 2025-01-08 DIAGNOSIS — M21.611 BILATERAL BUNIONS: ICD-10-CM

## 2025-01-08 DIAGNOSIS — E66.01 CLASS 3 SEVERE OBESITY DUE TO EXCESS CALORIES WITH BODY MASS INDEX (BMI) OF 40.0 TO 44.9 IN ADULT, UNSPECIFIED WHETHER SERIOUS COMORBIDITY PRESENT: ICD-10-CM

## 2025-01-08 DIAGNOSIS — M79.671 FOOT PAIN, BILATERAL: ICD-10-CM

## 2025-01-08 DIAGNOSIS — M79.672 FOOT PAIN, BILATERAL: ICD-10-CM

## 2025-01-08 LAB
25(OH)D3 SERPL-MCNC: 25.4 NG/ML
ALBUMIN SERPL-MCNC: 4 G/DL (ref 3.4–5)
ALBUMIN/GLOB SERPL: 1.3 {RATIO} (ref 1.1–2.2)
ALP SERPL-CCNC: 80 U/L (ref 40–129)
ALT SERPL-CCNC: 28 U/L (ref 10–40)
ANION GAP SERPL CALCULATED.3IONS-SCNC: 10 MMOL/L (ref 3–16)
AST SERPL-CCNC: 25 U/L (ref 15–37)
BILIRUB SERPL-MCNC: 0.5 MG/DL (ref 0–1)
BUN SERPL-MCNC: 12 MG/DL (ref 7–20)
CALCIUM SERPL-MCNC: 10.4 MG/DL (ref 8.3–10.6)
CHLORIDE SERPL-SCNC: 105 MMOL/L (ref 99–110)
CHOLEST SERPL-MCNC: 177 MG/DL (ref 0–199)
CO2 SERPL-SCNC: 24 MMOL/L (ref 21–32)
CREAT SERPL-MCNC: 0.9 MG/DL (ref 0.6–1.1)
EST. AVERAGE GLUCOSE BLD GHB EST-MCNC: 105.4 MG/DL
GFR SERPLBLD CREATININE-BSD FMLA CKD-EPI: 76 ML/MIN/{1.73_M2}
GLUCOSE SERPL-MCNC: 101 MG/DL (ref 70–99)
HBA1C MFR BLD: 5.3 %
HDLC SERPL-MCNC: 51 MG/DL (ref 40–60)
LDLC SERPL CALC-MCNC: 110 MG/DL
POTASSIUM SERPL-SCNC: 4.6 MMOL/L (ref 3.5–5.1)
PROT SERPL-MCNC: 7 G/DL (ref 6.4–8.2)
SODIUM SERPL-SCNC: 139 MMOL/L (ref 136–145)
TRIGL SERPL-MCNC: 82 MG/DL (ref 0–150)
TSH SERPL DL<=0.005 MIU/L-ACNC: 2.22 UIU/ML (ref 0.27–4.2)
URATE SERPL-MCNC: 5.7 MG/DL (ref 2.6–6)
VLDLC SERPL CALC-MCNC: 16 MG/DL

## 2025-01-08 SDOH — ECONOMIC STABILITY: FOOD INSECURITY: WITHIN THE PAST 12 MONTHS, YOU WORRIED THAT YOUR FOOD WOULD RUN OUT BEFORE YOU GOT MONEY TO BUY MORE.: NEVER TRUE

## 2025-01-08 SDOH — ECONOMIC STABILITY: FOOD INSECURITY: WITHIN THE PAST 12 MONTHS, THE FOOD YOU BOUGHT JUST DIDN'T LAST AND YOU DIDN'T HAVE MONEY TO GET MORE.: NEVER TRUE

## 2025-01-08 ASSESSMENT — PATIENT HEALTH QUESTIONNAIRE - PHQ9
2. FEELING DOWN, DEPRESSED OR HOPELESS: NOT AT ALL
SUM OF ALL RESPONSES TO PHQ QUESTIONS 1-9: 0
SUM OF ALL RESPONSES TO PHQ9 QUESTIONS 1 & 2: 0
SUM OF ALL RESPONSES TO PHQ QUESTIONS 1-9: 0
1. LITTLE INTEREST OR PLEASURE IN DOING THINGS: NOT AT ALL
SUM OF ALL RESPONSES TO PHQ QUESTIONS 1-9: 0
SUM OF ALL RESPONSES TO PHQ QUESTIONS 1-9: 0

## 2025-01-08 NOTE — PROGRESS NOTES
Valentina James (:  1970) is a 54 y.o. female,Established patient, here for evaluation of the following chief complaint(s):  Check-Up         Assessment & Plan  Class 3 severe obesity due to excess calories with body mass index (BMI) of 40.0 to 44.9 in adult, unspecified whether serious comorbidity present    Discussed calories in versus calories out.  With a history of PCOS administer metformin 500 mg once a day for a week increase to twice daily after that.  Will follow-up in 1 month.  Labs are ordered for today.  Consideration for Adipex or GLP-1 in the future    Orders:    Comprehensive Metabolic Panel    Hemoglobin A1C    Lipid Panel    TSH reflex to FT4    metFORMIN (GLUCOPHAGE) 500 MG tablet; Take 1 tablet by mouth 2 times daily (with meals)    Vitamin D deficiency    Last vitamin D level was 19.  Will recheck today patient has been on high-dose for an extended period of time.    Orders:    Vitamin D 25 Hydroxy    History of PCOS    Lab work today.  Initiate metformin.    Orders:    Hemoglobin A1C    metFORMIN (GLUCOPHAGE) 500 MG tablet; Take 1 tablet by mouth 2 times daily (with meals)    Bilateral bunions    Referred to podiatry.    Orders:    Non [facility] - External Referral To Podiatry    Foot pain, bilateral    Rule out gout    Orders:    Uric Acid      Return in about 4 weeks (around 2025).       Subjective   HPI  54-year-old female here for follow-up visit multiple concerns including knee and foot pain.  Indicates she has stopped working.  She has a difficult time with exercise.  She is a picky eater eats mostly meats often fried.  Minimal vegetables.  She is learning to eat salads but with a lot of dressing.  Review of Systems   No headache chest pain shortness of breath    Objective   Physical Exam  HENT:      Head: Normocephalic and atraumatic.      Ears:      Comments: Right TM shows serous effusion and scar tissue    Left TM is clear  Eyes:      Extraocular Movements: Extraocular

## 2025-02-11 ENCOUNTER — OFFICE VISIT (OUTPATIENT)
Dept: FAMILY MEDICINE CLINIC | Age: 55
End: 2025-02-11
Payer: COMMERCIAL

## 2025-02-11 VITALS
WEIGHT: 248.2 LBS | HEART RATE: 105 BPM | SYSTOLIC BLOOD PRESSURE: 128 MMHG | OXYGEN SATURATION: 91 % | DIASTOLIC BLOOD PRESSURE: 80 MMHG | BODY MASS INDEX: 43.98 KG/M2 | HEIGHT: 63 IN

## 2025-02-11 DIAGNOSIS — E66.813 CLASS 3 SEVERE OBESITY DUE TO EXCESS CALORIES WITH BODY MASS INDEX (BMI) OF 40.0 TO 44.9 IN ADULT, UNSPECIFIED WHETHER SERIOUS COMORBIDITY PRESENT: Primary | ICD-10-CM

## 2025-02-11 DIAGNOSIS — E66.01 CLASS 3 SEVERE OBESITY DUE TO EXCESS CALORIES WITH BODY MASS INDEX (BMI) OF 40.0 TO 44.9 IN ADULT, UNSPECIFIED WHETHER SERIOUS COMORBIDITY PRESENT: Primary | ICD-10-CM

## 2025-02-11 DIAGNOSIS — Z87.42 HISTORY OF PCOS: ICD-10-CM

## 2025-02-11 PROCEDURE — 1036F TOBACCO NON-USER: CPT | Performed by: FAMILY MEDICINE

## 2025-02-11 PROCEDURE — G8417 CALC BMI ABV UP PARAM F/U: HCPCS | Performed by: FAMILY MEDICINE

## 2025-02-11 PROCEDURE — 99213 OFFICE O/P EST LOW 20 MIN: CPT | Performed by: FAMILY MEDICINE

## 2025-02-11 PROCEDURE — G8427 DOCREV CUR MEDS BY ELIG CLIN: HCPCS | Performed by: FAMILY MEDICINE

## 2025-02-11 PROCEDURE — 3017F COLORECTAL CA SCREEN DOC REV: CPT | Performed by: FAMILY MEDICINE

## 2025-02-11 RX ORDER — METFORMIN HYDROCHLORIDE 500 MG/1
TABLET, EXTENDED RELEASE ORAL
Qty: 180 TABLET | Refills: 1 | Status: SHIPPED | OUTPATIENT
Start: 2025-02-11

## 2025-02-11 NOTE — PROGRESS NOTES
Valentina James (:  1970) is a 54 y.o. female,Established patient, here for evaluation of the following chief complaint(s):  Follow-up         Assessment & Plan  Class 3 severe obesity due to excess calories with body mass index (BMI) of 40.0 to 44.9 in adult, unspecified whether serious comorbidity present    3 pound weight loss but unable to tolerate the immediate release metformin due to diarrhea.  Will switch to extended release.  Discussed other options she does not want to do injections.  Consider Adipex however heart rate is elevated today.  Encouraged increasing activity.    Orders:    metFORMIN (GLUCOPHAGE-XR) 500 MG extended release tablet; 1 tablet by mouth twice daily    History of PCOS    Continue metformin changed to XR    Orders:    metFORMIN (GLUCOPHAGE-XR) 500 MG extended release tablet; 1 tablet by mouth twice daily      Return in about 3 months (around 2025).       Subjective   HPI  54-year-old female here for a follow-up visit.  Reviewed lab work.  3 pound weight loss since interval.  We discussed calories and calories out.  She has had some success but is not doing much in terms of physical activity.  Review of Systems   No headache chest pain shortness of breath    Objective   Physical Exam  Constitutional:       Appearance: Normal appearance.   HENT:      Head: Normocephalic and atraumatic.   Eyes:      Extraocular Movements: Extraocular movements intact.   Neurological:      General: No focal deficit present.      Mental Status: She is alert.                  An electronic signature was used to authenticate this note.    --Eric Vivar MD

## 2025-05-12 ENCOUNTER — OFFICE VISIT (OUTPATIENT)
Dept: FAMILY MEDICINE CLINIC | Age: 55
End: 2025-05-12
Payer: COMMERCIAL

## 2025-05-12 VITALS
OXYGEN SATURATION: 98 % | WEIGHT: 251 LBS | TEMPERATURE: 98.1 F | BODY MASS INDEX: 44.46 KG/M2 | HEART RATE: 80 BPM | SYSTOLIC BLOOD PRESSURE: 128 MMHG | RESPIRATION RATE: 20 BRPM | DIASTOLIC BLOOD PRESSURE: 80 MMHG

## 2025-05-12 DIAGNOSIS — Z87.42 HISTORY OF PCOS: ICD-10-CM

## 2025-05-12 DIAGNOSIS — E66.813 CLASS 3 SEVERE OBESITY DUE TO EXCESS CALORIES WITH BODY MASS INDEX (BMI) OF 40.0 TO 44.9 IN ADULT, UNSPECIFIED WHETHER SERIOUS COMORBIDITY PRESENT (HCC): Primary | ICD-10-CM

## 2025-05-12 PROCEDURE — 99213 OFFICE O/P EST LOW 20 MIN: CPT | Performed by: FAMILY MEDICINE

## 2025-05-12 PROCEDURE — 3017F COLORECTAL CA SCREEN DOC REV: CPT | Performed by: FAMILY MEDICINE

## 2025-05-12 PROCEDURE — G8417 CALC BMI ABV UP PARAM F/U: HCPCS | Performed by: FAMILY MEDICINE

## 2025-05-12 PROCEDURE — 1036F TOBACCO NON-USER: CPT | Performed by: FAMILY MEDICINE

## 2025-05-12 PROCEDURE — G8427 DOCREV CUR MEDS BY ELIG CLIN: HCPCS | Performed by: FAMILY MEDICINE

## 2025-05-12 NOTE — PROGRESS NOTES
Valentina James (:  1970) is a 55 y.o. female,Established patient, here for evaluation of the following chief complaint(s):  Follow-up (3 mon )           Assessment & Plan  Class 3 severe obesity due to excess calories with body mass index (BMI) of 40.0 to 44.9 in adult, unspecified whether serious comorbidity present (HCC)    Discontinue metformin.  Start Zepbound 2.5 mg weekly.    Orders:    tirzepatide-weight management (ZEPBOUND) 2.5 MG/0.5ML SOAJ subCUTAneous auto-injector pen; Inject 0.5 mLs into the skin once a week for 28 days    History of PCOS    As above           Return in 4 weeks (on 2025) for weight management.       Subjective   HPI  55-year-old female here for follow-up visit.  Tolerating the metformin.  Had initially had some success with weight loss but has plateaued and started to regain weight.  Indicates she has been walking as her source of exercise.  No other complaints today.    Review of Systems       Objective   Vitals:    25 0944   BP: 128/80   Pulse: 80   Resp: 20   Temp: 98.1 °F (36.7 °C)   SpO2: 98%     Physical Exam  Constitutional:       Appearance: Normal appearance.   HENT:      Head: Normocephalic and atraumatic.   Eyes:      Extraocular Movements: Extraocular movements intact.   Neurological:      General: No focal deficit present.      Mental Status: She is alert.                This dictation was generated by a voice recognition computer software.  Although all attempts are made to edit the dictation for accuracy, there may be errors in the transcription that are not intended.    An electronic signature was used to authenticate this note.    --Eric Vivar MD

## 2025-06-03 DIAGNOSIS — M17.0 BILATERAL PRIMARY OSTEOARTHRITIS OF KNEE: ICD-10-CM

## 2025-06-03 RX ORDER — CELECOXIB 200 MG/1
200 CAPSULE ORAL DAILY
Qty: 90 CAPSULE | Refills: 1 | Status: SHIPPED | OUTPATIENT
Start: 2025-06-03

## 2025-06-09 ENCOUNTER — OFFICE VISIT (OUTPATIENT)
Dept: FAMILY MEDICINE CLINIC | Age: 55
End: 2025-06-09
Payer: COMMERCIAL

## 2025-06-09 VITALS
DIASTOLIC BLOOD PRESSURE: 80 MMHG | RESPIRATION RATE: 20 BRPM | SYSTOLIC BLOOD PRESSURE: 124 MMHG | HEART RATE: 83 BPM | OXYGEN SATURATION: 98 % | WEIGHT: 248 LBS | BODY MASS INDEX: 43.93 KG/M2

## 2025-06-09 DIAGNOSIS — Z87.42 HISTORY OF PCOS: ICD-10-CM

## 2025-06-09 DIAGNOSIS — M79.671 FOOT PAIN, BILATERAL: ICD-10-CM

## 2025-06-09 DIAGNOSIS — M21.612 BILATERAL BUNIONS: ICD-10-CM

## 2025-06-09 DIAGNOSIS — M25.561 CHRONIC PAIN OF RIGHT KNEE: ICD-10-CM

## 2025-06-09 DIAGNOSIS — E66.813 CLASS 3 SEVERE OBESITY DUE TO EXCESS CALORIES WITH BODY MASS INDEX (BMI) OF 40.0 TO 44.9 IN ADULT, UNSPECIFIED WHETHER SERIOUS COMORBIDITY PRESENT (HCC): Primary | ICD-10-CM

## 2025-06-09 DIAGNOSIS — G89.29 CHRONIC PAIN OF RIGHT KNEE: ICD-10-CM

## 2025-06-09 DIAGNOSIS — M79.672 FOOT PAIN, BILATERAL: ICD-10-CM

## 2025-06-09 DIAGNOSIS — M21.611 BILATERAL BUNIONS: ICD-10-CM

## 2025-06-09 PROCEDURE — G8427 DOCREV CUR MEDS BY ELIG CLIN: HCPCS | Performed by: FAMILY MEDICINE

## 2025-06-09 PROCEDURE — 3017F COLORECTAL CA SCREEN DOC REV: CPT | Performed by: FAMILY MEDICINE

## 2025-06-09 PROCEDURE — 1036F TOBACCO NON-USER: CPT | Performed by: FAMILY MEDICINE

## 2025-06-09 PROCEDURE — G8417 CALC BMI ABV UP PARAM F/U: HCPCS | Performed by: FAMILY MEDICINE

## 2025-06-09 PROCEDURE — 99214 OFFICE O/P EST MOD 30 MIN: CPT | Performed by: FAMILY MEDICINE

## 2025-06-09 NOTE — PROGRESS NOTES
Valentina James (:  1970) is a 55 y.o. female,Established patient, here for evaluation of the following chief complaint(s):  Weight Management           Assessment & Plan  Class 3 severe obesity due to excess calories with body mass index (BMI) of 40.0 to 44.9 in adult, unspecified whether serious comorbidity present (HCC)    Tolerating the Zepbound.  Down 3 pounds.  Has noticed decreased appetite.  Agrees with increasing to 5 mg dose.    Orders:    tirzepatide-weight management (ZEPBOUND) 5 MG/0.5ML SOAJ subCUTAneous auto-injector pen; Inject 5 mg into the skin every 7 days for 24 days    History of PCOS    As above    Orders:    tirzepatide-weight management (ZEPBOUND) 5 MG/0.5ML SOAJ subCUTAneous auto-injector pen; Inject 5 mg into the skin every 7 days for 24 days    Chronic pain of right knee    Refer to Dr. Way who did her previous knee surgery.    Orders:    Non-[facility] External Referral To Orthopedic Surgery    Bilateral bunions    Refer to [facility] would like to see the surgeon her brother saw.    Orders:    Non [facility] - External Referral To Podiatry    Foot pain, bilateral    As above    Orders:    Non [facility] - External Referral To Podiatry      Return in about 4 weeks (around 2025) for weight management.       Subjective   HPI  55-year-old female here for a follow-up visit.  Tolerating the Zepbound.  Has had a full month of doses.  Has had a 3 pound weight loss.  Has noticed decreased appetite.  No side effects.    Indicates that the pain in her knee has gotten intolerable not responding to the Celebrex.  She would like to see orthopedics has had previous arthroscopy performed by Dr. Way who is moved from [facility] to [facility].    Review of Systems       Objective   Vitals:    25 0859   BP: 124/80   Pulse: 83   Resp: 20   SpO2: 98%     Physical Exam  Constitutional:       Appearance: Normal appearance.   HENT:      Head: Normocephalic and atraumatic.   Eyes:

## 2025-07-08 ENCOUNTER — OFFICE VISIT (OUTPATIENT)
Dept: FAMILY MEDICINE CLINIC | Age: 55
End: 2025-07-08
Payer: COMMERCIAL

## 2025-07-08 VITALS
HEART RATE: 84 BPM | HEIGHT: 64 IN | OXYGEN SATURATION: 98 % | WEIGHT: 242 LBS | DIASTOLIC BLOOD PRESSURE: 82 MMHG | BODY MASS INDEX: 41.32 KG/M2 | RESPIRATION RATE: 20 BRPM | SYSTOLIC BLOOD PRESSURE: 122 MMHG

## 2025-07-08 DIAGNOSIS — E66.813 CLASS 3 SEVERE OBESITY DUE TO EXCESS CALORIES WITH BODY MASS INDEX (BMI) OF 40.0 TO 44.9 IN ADULT, UNSPECIFIED WHETHER SERIOUS COMORBIDITY PRESENT (HCC): Primary | ICD-10-CM

## 2025-07-08 DIAGNOSIS — Z87.42 HISTORY OF PCOS: ICD-10-CM

## 2025-07-08 PROCEDURE — G8417 CALC BMI ABV UP PARAM F/U: HCPCS | Performed by: FAMILY MEDICINE

## 2025-07-08 PROCEDURE — 99213 OFFICE O/P EST LOW 20 MIN: CPT | Performed by: FAMILY MEDICINE

## 2025-07-08 PROCEDURE — 3017F COLORECTAL CA SCREEN DOC REV: CPT | Performed by: FAMILY MEDICINE

## 2025-07-08 PROCEDURE — 1036F TOBACCO NON-USER: CPT | Performed by: FAMILY MEDICINE

## 2025-07-08 PROCEDURE — G8427 DOCREV CUR MEDS BY ELIG CLIN: HCPCS | Performed by: FAMILY MEDICINE

## 2025-07-08 RX ORDER — TIRZEPATIDE 5 MG/.5ML
INJECTION, SOLUTION SUBCUTANEOUS WEEKLY
COMMUNITY
End: 2025-07-08 | Stop reason: DRUGHIGH

## 2025-07-08 NOTE — PROGRESS NOTES
Valentina James (:  1970) is a 55 y.o. female,Established patient, here for evaluation of the following chief complaint(s):  Weight Management           Assessment & Plan  Class 3 severe obesity due to excess calories with body mass index (BMI) of 40.0 to 44.9 in adult, unspecified whether serious comorbidity present (HCC)   She is down 15 pounds from December.  Has been able to continue weight loss despite recent vacation.  Tolerating the Zepbound.  We will increase from 5 to 7.5 mg.  1 month follow-up    Orders:    tirzepatide-weight management (ZEPBOUND) 7.5 MG/0.5ML SOAJ subCUTAneous auto-injector pen; Inject 7.5 mg into the skin every 7 days    History of PCOS   As above    Orders:    tirzepatide-weight management (ZEPBOUND) 7.5 MG/0.5ML SOAJ subCUTAneous auto-injector pen; Inject 7.5 mg into the skin every 7 days        ICD-10-CM    1. Class 3 severe obesity due to excess calories with body mass index (BMI) of 40.0 to 44.9 in adult, unspecified whether serious comorbidity present (HCC)  E66.813 tirzepatide-weight management (ZEPBOUND) 7.5 MG/0.5ML SOAJ subCUTAneous auto-injector pen    Z68.41       2. History of PCOS  Z87.42 tirzepatide-weight management (ZEPBOUND) 7.5 MG/0.5ML SOAJ subCUTAneous auto-injector pen        Return in about 4 weeks (around 2025) for weight management.       Subjective   HPI  55-year-old female here for follow-up on obesity.  Recently returned from vacation.  No complaints from the medication.  Has continued to lose weight effectively    Prior to Visit Medications    Medication Sig Taking? Authorizing Provider   tirzepatide-weight management (ZEPBOUND) 7.5 MG/0.5ML SOAJ subCUTAneous auto-injector pen Inject 7.5 mg into the skin every 7 days Yes Eric Vivar MD   celecoxib (CELEBREX) 200 MG capsule TAKE 1 CAPSULE BY MOUTH DAILY Yes Eric Vivar MD   Cholecalciferol (VITAMIN D) 50 MCG ( UT) CAPS capsule Take by mouth Yes ProviderMichael MD      Review of

## 2025-08-01 ENCOUNTER — PATIENT MESSAGE (OUTPATIENT)
Dept: FAMILY MEDICINE CLINIC | Age: 55
End: 2025-08-01

## 2025-08-01 DIAGNOSIS — E66.813 CLASS 3 SEVERE OBESITY DUE TO EXCESS CALORIES WITH BODY MASS INDEX (BMI) OF 40.0 TO 44.9 IN ADULT, UNSPECIFIED WHETHER SERIOUS COMORBIDITY PRESENT (HCC): ICD-10-CM

## 2025-08-01 DIAGNOSIS — Z87.42 HISTORY OF PCOS: ICD-10-CM

## 2025-08-04 DIAGNOSIS — Z87.42 HISTORY OF PCOS: ICD-10-CM

## 2025-08-04 DIAGNOSIS — E66.813 CLASS 3 SEVERE OBESITY DUE TO EXCESS CALORIES WITH BODY MASS INDEX (BMI) OF 40.0 TO 44.9 IN ADULT, UNSPECIFIED WHETHER SERIOUS COMORBIDITY PRESENT (HCC): ICD-10-CM

## 2025-08-12 ENCOUNTER — OFFICE VISIT (OUTPATIENT)
Dept: FAMILY MEDICINE CLINIC | Age: 55
End: 2025-08-12
Payer: COMMERCIAL

## 2025-08-12 VITALS
HEART RATE: 80 BPM | OXYGEN SATURATION: 98 % | WEIGHT: 238 LBS | DIASTOLIC BLOOD PRESSURE: 84 MMHG | SYSTOLIC BLOOD PRESSURE: 122 MMHG | RESPIRATION RATE: 20 BRPM | BODY MASS INDEX: 41.39 KG/M2

## 2025-08-12 DIAGNOSIS — M17.11 PRIMARY OSTEOARTHRITIS OF RIGHT KNEE: ICD-10-CM

## 2025-08-12 DIAGNOSIS — E66.813 CLASS 3 SEVERE OBESITY DUE TO EXCESS CALORIES WITH BODY MASS INDEX (BMI) OF 40.0 TO 44.9 IN ADULT, UNSPECIFIED WHETHER SERIOUS COMORBIDITY PRESENT (HCC): ICD-10-CM

## 2025-08-12 DIAGNOSIS — Z01.818 PREOP EXAMINATION: Primary | ICD-10-CM

## 2025-08-12 DIAGNOSIS — Z87.42 HISTORY OF PCOS: ICD-10-CM

## 2025-08-12 PROBLEM — E66.9 OBESITY, UNSPECIFIED: Status: ACTIVE | Noted: 2025-08-12

## 2025-08-12 PROCEDURE — G8427 DOCREV CUR MEDS BY ELIG CLIN: HCPCS | Performed by: FAMILY MEDICINE

## 2025-08-12 PROCEDURE — 3017F COLORECTAL CA SCREEN DOC REV: CPT | Performed by: FAMILY MEDICINE

## 2025-08-12 PROCEDURE — 1036F TOBACCO NON-USER: CPT | Performed by: FAMILY MEDICINE

## 2025-08-12 PROCEDURE — G8417 CALC BMI ABV UP PARAM F/U: HCPCS | Performed by: FAMILY MEDICINE

## 2025-08-12 PROCEDURE — 99214 OFFICE O/P EST MOD 30 MIN: CPT | Performed by: FAMILY MEDICINE

## 2025-08-12 ASSESSMENT — ENCOUNTER SYMPTOMS
NAUSEA: 0
VOMITING: 0
DIARRHEA: 0
CONSTIPATION: 0
SHORTNESS OF BREATH: 0

## (undated) DEVICE — Z INACTIVE NO SUPPLIER SOLUTIONIRRIG 3000ML 0.9% SOD CHL FLX CONT [79720808] [HOSPIRA WORLDWIDE INC]

## (undated) DEVICE — GLOVE SURG SZ 7 L12IN FNGR THK94MIL STD WHT ISOLEX LTX FREE

## (undated) DEVICE — SUTURE ETHLN SZ 4-0 L18IN NONABSORBABLE BLK L19MM PS-2 3/8 1667H

## (undated) DEVICE — PACK PROCEDURE SURG SURGERYARTHROSCOPY KNEE

## (undated) DEVICE — TUBING PMP IRRIG GOFLO

## (undated) DEVICE — OCCLUSIVE GAUZE STRIP,3% BISMUTH TRIBROMOPHENATE IN PETROLATUM BLEND: Brand: XEROFORM

## (undated) DEVICE — WEREWOLF FLOW 90 COBLATION WAND: Brand: COBLATION

## (undated) DEVICE — 3.5 MM FULL RADIUS STRAIGHT                                    BLADES, POWER/EP-1, BEIGE, PACKAGED                                    6 PER BOX, STERILE

## (undated) DEVICE — MANIFOLD SURG NEPTUNE WST MGMT

## (undated) DEVICE — BANDAGE COMPR M W6INXL10YD WHT BGE VELC E MTRX HK AND LOOP

## (undated) DEVICE — PADDING CAST N ADH 12X6 IN CRIMPED FINISH 100% COTTON WBRLII

## (undated) DEVICE — STERILE LATEX POWDER-FREE SURGICAL GLOVESWITH NITRILE COATING: Brand: PROTEXIS